# Patient Record
Sex: FEMALE | Race: WHITE | NOT HISPANIC OR LATINO | Employment: UNEMPLOYED | URBAN - METROPOLITAN AREA
[De-identification: names, ages, dates, MRNs, and addresses within clinical notes are randomized per-mention and may not be internally consistent; named-entity substitution may affect disease eponyms.]

---

## 2017-01-11 RX ORDER — CLOPIDOGREL BISULFATE 75 MG/1
TABLET ORAL
Qty: 90 TABLET | Refills: 3 | Status: SHIPPED | OUTPATIENT
Start: 2017-01-11

## 2017-03-29 ENCOUNTER — LAB (OUTPATIENT)
Dept: LAB | Facility: HOSPITAL | Age: 42
End: 2017-03-29

## 2017-03-29 ENCOUNTER — TRANSCRIBE ORDERS (OUTPATIENT)
Dept: LAB | Facility: HOSPITAL | Age: 42
End: 2017-03-29

## 2017-03-29 DIAGNOSIS — A04.72 INTESTINAL INFECTION DUE TO CLOSTRIDIUM DIFFICILE: ICD-10-CM

## 2017-03-29 DIAGNOSIS — L02.416 ABSCESS OF LEFT HIP: Primary | ICD-10-CM

## 2017-03-29 DIAGNOSIS — L03.116 CELLULITIS OF LEFT FOOT: ICD-10-CM

## 2017-03-29 DIAGNOSIS — L02.416 ABSCESS OF LEFT HIP: ICD-10-CM

## 2017-03-29 LAB
ANION GAP SERPL CALCULATED.3IONS-SCNC: -4 MMOL/L (ref 3–11)
BUN BLD-MCNC: 10 MG/DL (ref 9–23)
BUN/CREAT SERPL: 12.5 (ref 7–25)
CALCIUM SPEC-SCNC: 9.5 MG/DL (ref 8.7–10.4)
CHLORIDE SERPL-SCNC: 107 MMOL/L (ref 99–109)
CO2 SERPL-SCNC: 35 MMOL/L (ref 20–31)
CREAT BLD-MCNC: 0.8 MG/DL (ref 0.6–1.3)
GFR SERPL CREATININE-BSD FRML MDRD: 78 ML/MIN/1.73
GLUCOSE BLD-MCNC: 154 MG/DL (ref 70–100)
POTASSIUM BLD-SCNC: 4.3 MMOL/L (ref 3.5–5.5)
SODIUM BLD-SCNC: 138 MMOL/L (ref 132–146)

## 2017-03-29 PROCEDURE — 36415 COLL VENOUS BLD VENIPUNCTURE: CPT | Performed by: INTERNAL MEDICINE

## 2017-03-29 PROCEDURE — 87205 SMEAR GRAM STAIN: CPT | Performed by: INTERNAL MEDICINE

## 2017-03-29 PROCEDURE — 80048 BASIC METABOLIC PNL TOTAL CA: CPT | Performed by: INTERNAL MEDICINE

## 2017-03-29 PROCEDURE — 87070 CULTURE OTHR SPECIMN AEROBIC: CPT | Performed by: INTERNAL MEDICINE

## 2017-03-31 LAB
BACTERIA SPEC AEROBE CULT: NO GROWTH
GRAM STN SPEC: NORMAL

## 2017-05-22 DIAGNOSIS — Z01.818 PRE-OP EVALUATION: Primary | ICD-10-CM

## 2017-05-27 ENCOUNTER — RESULTS ENCOUNTER (OUTPATIENT)
Dept: PAIN MEDICINE | Facility: CLINIC | Age: 42
End: 2017-05-27

## 2017-05-27 DIAGNOSIS — Z01.818 PRE-OP EVALUATION: ICD-10-CM

## 2019-09-10 ENCOUNTER — TRANSCRIBE ORDERS (OUTPATIENT)
Dept: ADMINISTRATIVE | Facility: HOSPITAL | Age: 44
End: 2019-09-10

## 2019-09-10 DIAGNOSIS — K43.9 VENTRAL HERNIA WITHOUT OBSTRUCTION OR GANGRENE: Primary | ICD-10-CM

## 2019-09-17 ENCOUNTER — APPOINTMENT (OUTPATIENT)
Dept: CT IMAGING | Facility: HOSPITAL | Age: 44
End: 2019-09-17

## 2019-09-20 ENCOUNTER — HOSPITAL ENCOUNTER (OUTPATIENT)
Dept: CT IMAGING | Facility: HOSPITAL | Age: 44
Discharge: HOME | End: 2019-09-20
Admitting: SURGERY

## 2019-09-20 DIAGNOSIS — K43.9 VENTRAL HERNIA WITHOUT OBSTRUCTION OR GANGRENE: ICD-10-CM

## 2019-09-20 PROCEDURE — 74176 CT ABD & PELVIS W/O CONTRAST: CPT

## 2022-01-01 ENCOUNTER — APPOINTMENT (OUTPATIENT)
Dept: GENERAL RADIOLOGY | Facility: HOSPITAL | Age: 47
End: 2022-01-01

## 2022-01-01 ENCOUNTER — HOSPITAL ENCOUNTER (INPATIENT)
Facility: HOSPITAL | Age: 47
LOS: 1 days | End: 2022-06-13
Attending: EMERGENCY MEDICINE | Admitting: INTERNAL MEDICINE

## 2022-01-01 VITALS
HEART RATE: 44 BPM | WEIGHT: 190.1 LBS | TEMPERATURE: 97.9 F | HEIGHT: 66 IN | RESPIRATION RATE: 16 BRPM | DIASTOLIC BLOOD PRESSURE: 73 MMHG | SYSTOLIC BLOOD PRESSURE: 127 MMHG | OXYGEN SATURATION: 48 % | BODY MASS INDEX: 30.55 KG/M2

## 2022-01-01 DIAGNOSIS — I73.9 PERIPHERAL VASCULAR DISEASE: ICD-10-CM

## 2022-01-01 DIAGNOSIS — S91.152A OPEN WOUND OF LEFT GREAT TOE DUE TO DOG BITE: ICD-10-CM
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
## 2022-01-01 DIAGNOSIS — A41.9 SEPSIS WITHOUT ACUTE ORGAN DYSFUNCTION, DUE TO UNSPECIFIED ORGANISM: Primary | ICD-10-CM

## 2022-01-01 DIAGNOSIS — E87.6 HYPOKALEMIA: ICD-10-CM

## 2022-01-01 DIAGNOSIS — I96 GANGRENE OF LEFT FOOT: ICD-10-CM

## 2022-01-01 DIAGNOSIS — E11.9 NON-INSULIN DEPENDENT TYPE 2 DIABETES MELLITUS: ICD-10-CM

## 2022-01-01 LAB
ALBUMIN SERPL-MCNC: 2.7 G/DL (ref 3.5–5.2)
ALBUMIN/GLOB SERPL: 0.7 G/DL
ALP SERPL-CCNC: 191 U/L (ref 39–117)
ALT SERPL W P-5'-P-CCNC: 8 U/L (ref 1–33)
ANION GAP SERPL CALCULATED.3IONS-SCNC: 11 MMOL/L (ref 5–15)
AST SERPL-CCNC: 23 U/L (ref 1–32)
B PARAPERT DNA SPEC QL NAA+PROBE: NOT DETECTED
B PERT DNA SPEC QL NAA+PROBE: NOT DETECTED
BASOPHILS # BLD AUTO: 0.03 10*3/MM3 (ref 0–0.2)
BASOPHILS NFR BLD AUTO: 0.2 % (ref 0–1.5)
BILIRUB SERPL-MCNC: 3 MG/DL (ref 0–1.2)
BUN SERPL-MCNC: 12 MG/DL (ref 6–20)
BUN/CREAT SERPL: 22.6 (ref 7–25)
C PNEUM DNA NPH QL NAA+NON-PROBE: NOT DETECTED
CALCIUM SPEC-SCNC: 8.7 MG/DL (ref 8.6–10.5)
CHLORIDE SERPL-SCNC: 97 MMOL/L (ref 98–107)
CO2 SERPL-SCNC: 31 MMOL/L (ref 22–29)
CREAT SERPL-MCNC: 0.53 MG/DL (ref 0.57–1)
CRP SERPL-MCNC: 11.65 MG/DL (ref 0–0.5)
D-LACTATE SERPL-SCNC: 2 MMOL/L (ref 0.5–2)
DEPRECATED RDW RBC AUTO: 65.2 FL (ref 37–54)
EGFRCR SERPLBLD CKD-EPI 2021: 114.2 ML/MIN/1.73
EOSINOPHIL # BLD AUTO: 0.04 10*3/MM3 (ref 0–0.4)
EOSINOPHIL NFR BLD AUTO: 0.3 % (ref 0.3–6.2)
ERYTHROCYTE [DISTWIDTH] IN BLOOD BY AUTOMATED COUNT: 18.8 % (ref 12.3–15.4)
ERYTHROCYTE [SEDIMENTATION RATE] IN BLOOD: 45 MM/HR (ref 0–20)
FERRITIN SERPL-MCNC: 207.4 NG/ML (ref 13–150)
FLUAV RNA RESP QL NAA+PROBE: NOT DETECTED
FLUAV SUBTYP SPEC NAA+PROBE: NOT DETECTED
FLUBV RNA ISLT QL NAA+PROBE: NOT DETECTED
FLUBV RNA RESP QL NAA+PROBE: NOT DETECTED
GLOBULIN UR ELPH-MCNC: 4.1 GM/DL
GLUCOSE SERPL-MCNC: 97 MG/DL (ref 65–99)
HADV DNA SPEC NAA+PROBE: NOT DETECTED
HCOV 229E RNA SPEC QL NAA+PROBE: NOT DETECTED
HCOV HKU1 RNA SPEC QL NAA+PROBE: NOT DETECTED
HCOV NL63 RNA SPEC QL NAA+PROBE: NOT DETECTED
HCOV OC43 RNA SPEC QL NAA+PROBE: NOT DETECTED
HCT VFR BLD AUTO: 36.4 % (ref 34–46.6)
HGB BLD-MCNC: 11.6 G/DL (ref 12–15.9)
HMPV RNA NPH QL NAA+NON-PROBE: NOT DETECTED
HOLD SPECIMEN: NORMAL
HPIV1 RNA ISLT QL NAA+PROBE: NOT DETECTED
HPIV2 RNA SPEC QL NAA+PROBE: NOT DETECTED
HPIV3 RNA NPH QL NAA+PROBE: NOT DETECTED
HPIV4 P GENE NPH QL NAA+PROBE: NOT DETECTED
IMM GRANULOCYTES # BLD AUTO: 0.12 10*3/MM3 (ref 0–0.05)
IMM GRANULOCYTES NFR BLD AUTO: 0.9 % (ref 0–0.5)
LDH SERPL-CCNC: 305 U/L (ref 135–214)
LYMPHOCYTES # BLD AUTO: 1.74 10*3/MM3 (ref 0.7–3.1)
LYMPHOCYTES NFR BLD AUTO: 13 % (ref 19.6–45.3)
M PNEUMO IGG SER IA-ACNC: NOT DETECTED
MAGNESIUM SERPL-MCNC: 1.7 MG/DL (ref 1.6–2.6)
MCH RBC QN AUTO: 29.9 PG (ref 26.6–33)
MCHC RBC AUTO-ENTMCNC: 31.9 G/DL (ref 31.5–35.7)
MCV RBC AUTO: 93.8 FL (ref 79–97)
MONOCYTES # BLD AUTO: 0.74 10*3/MM3 (ref 0.1–0.9)
MONOCYTES NFR BLD AUTO: 5.5 % (ref 5–12)
NEUTROPHILS NFR BLD AUTO: 10.75 10*3/MM3 (ref 1.7–7)
NEUTROPHILS NFR BLD AUTO: 80.1 % (ref 42.7–76)
NRBC BLD AUTO-RTO: 0 /100 WBC (ref 0–0.2)
PLATELET # BLD AUTO: 141 10*3/MM3 (ref 140–450)
PMV BLD AUTO: 10.7 FL (ref 6–12)
POTASSIUM SERPL-SCNC: 2.9 MMOL/L (ref 3.5–5.2)
PROCALCITONIN SERPL-MCNC: 1.02 NG/ML (ref 0–0.25)
PROT SERPL-MCNC: 6.8 G/DL (ref 6–8.5)
RBC # BLD AUTO: 3.88 10*6/MM3 (ref 3.77–5.28)
RHINOVIRUS RNA SPEC NAA+PROBE: NOT DETECTED
RSV RNA NPH QL NAA+NON-PROBE: NOT DETECTED
RSV RNA NPH QL NAA+NON-PROBE: NOT DETECTED
SARS-COV-2 RNA NPH QL NAA+NON-PROBE: DETECTED
SARS-COV-2 RNA RESP QL NAA+PROBE: DETECTED
SODIUM SERPL-SCNC: 139 MMOL/L (ref 136–145)
WBC NRBC COR # BLD: 13.42 10*3/MM3 (ref 3.4–10.8)
WHOLE BLOOD HOLD COAG: NORMAL
WHOLE BLOOD HOLD SPECIMEN: NORMAL

## 2022-01-01 PROCEDURE — 25010000002 MAGNESIUM SULFATE PER 500 MG OF MAGNESIUM: Performed by: INTERNAL MEDICINE

## 2022-01-01 PROCEDURE — 99223 1ST HOSP IP/OBS HIGH 75: CPT | Performed by: INTERNAL MEDICINE

## 2022-01-01 PROCEDURE — 83735 ASSAY OF MAGNESIUM: CPT | Performed by: PHYSICIAN ASSISTANT

## 2022-01-01 PROCEDURE — 25010000002 ONDANSETRON PER 1 MG: Performed by: PHYSICIAN ASSISTANT

## 2022-01-01 PROCEDURE — 71045 X-RAY EXAM CHEST 1 VIEW: CPT

## 2022-01-01 PROCEDURE — 84145 PROCALCITONIN (PCT): CPT | Performed by: PHYSICIAN ASSISTANT

## 2022-01-01 PROCEDURE — 87147 CULTURE TYPE IMMUNOLOGIC: CPT | Performed by: PHYSICIAN ASSISTANT

## 2022-01-01 PROCEDURE — 87205 SMEAR GRAM STAIN: CPT | Performed by: PHYSICIAN ASSISTANT

## 2022-01-01 PROCEDURE — 25010000002 VANCOMYCIN 10 G RECONSTITUTED SOLUTION: Performed by: PHYSICIAN ASSISTANT

## 2022-01-01 PROCEDURE — 94799 UNLISTED PULMONARY SVC/PX: CPT

## 2022-01-01 PROCEDURE — 0 MAGNESIUM SULFATE 4 GM/100ML SOLUTION: Performed by: PHYSICIAN ASSISTANT

## 2022-01-01 PROCEDURE — 99238 HOSP IP/OBS DSCHRG MGMT 30/<: CPT | Performed by: INTERNAL MEDICINE

## 2022-01-01 PROCEDURE — 5A12012 PERFORMANCE OF CARDIAC OUTPUT, SINGLE, MANUAL: ICD-10-PCS | Performed by: INTERNAL MEDICINE

## 2022-01-01 PROCEDURE — 85025 COMPLETE CBC W/AUTO DIFF WBC: CPT | Performed by: EMERGENCY MEDICINE

## 2022-01-01 PROCEDURE — 87070 CULTURE OTHR SPECIMN AEROBIC: CPT | Performed by: PHYSICIAN ASSISTANT

## 2022-01-01 PROCEDURE — 25010000002 DOBUTAMINE PER 250 MG: Performed by: INTERNAL MEDICINE

## 2022-01-01 PROCEDURE — 83605 ASSAY OF LACTIC ACID: CPT | Performed by: EMERGENCY MEDICINE

## 2022-01-01 PROCEDURE — 87186 SC STD MICRODIL/AGAR DIL: CPT | Performed by: PHYSICIAN ASSISTANT

## 2022-01-01 PROCEDURE — 87040 BLOOD CULTURE FOR BACTERIA: CPT | Performed by: EMERGENCY MEDICINE

## 2022-01-01 PROCEDURE — 99285 EMERGENCY DEPT VISIT HI MDM: CPT

## 2022-01-01 PROCEDURE — 87077 CULTURE AEROBIC IDENTIFY: CPT | Performed by: PHYSICIAN ASSISTANT

## 2022-01-01 PROCEDURE — 92950 HEART/LUNG RESUSCITATION CPR: CPT

## 2022-01-01 PROCEDURE — 87040 BLOOD CULTURE FOR BACTERIA: CPT | Performed by: PHYSICIAN ASSISTANT

## 2022-01-01 PROCEDURE — 0202U NFCT DS 22 TRGT SARS-COV-2: CPT | Performed by: PHYSICIAN ASSISTANT

## 2022-01-01 PROCEDURE — 25010000002 EPINEPHRINE 1 MG/10ML SOLUTION PREFILLED SYRINGE: Performed by: INTERNAL MEDICINE

## 2022-01-01 PROCEDURE — 25010000002 TENECTEPLASE PER 50 MG: Performed by: NURSE PRACTITIONER

## 2022-01-01 PROCEDURE — 86140 C-REACTIVE PROTEIN: CPT | Performed by: PHYSICIAN ASSISTANT

## 2022-01-01 PROCEDURE — 25010000002 PIPERACILLIN SOD-TAZOBACTAM PER 1 G: Performed by: PHYSICIAN ASSISTANT

## 2022-01-01 PROCEDURE — 87637 SARSCOV2&INF A&B&RSV AMP PRB: CPT | Performed by: PHYSICIAN ASSISTANT

## 2022-01-01 PROCEDURE — 85652 RBC SED RATE AUTOMATED: CPT | Performed by: PHYSICIAN ASSISTANT

## 2022-01-01 PROCEDURE — 73630 X-RAY EXAM OF FOOT: CPT

## 2022-01-01 PROCEDURE — 80053 COMPREHEN METABOLIC PANEL: CPT | Performed by: EMERGENCY MEDICINE

## 2022-01-01 PROCEDURE — 82728 ASSAY OF FERRITIN: CPT | Performed by: PHYSICIAN ASSISTANT

## 2022-01-01 PROCEDURE — 83615 LACTATE (LD) (LDH) ENZYME: CPT | Performed by: PHYSICIAN ASSISTANT

## 2022-01-01 PROCEDURE — 36415 COLL VENOUS BLD VENIPUNCTURE: CPT

## 2022-01-01 PROCEDURE — 25010000002 HYDROMORPHONE PER 4 MG: Performed by: EMERGENCY MEDICINE

## 2022-01-01 RX ORDER — MAGNESIUM SULFATE HEPTAHYDRATE 40 MG/ML
2 INJECTION, SOLUTION INTRAVENOUS AS NEEDED
Status: DISCONTINUED | OUTPATIENT
Start: 2022-01-01 | End: 2022-01-01 | Stop reason: HOSPADM

## 2022-01-01 RX ORDER — ACETAMINOPHEN 325 MG/1
650 TABLET ORAL EVERY 4 HOURS PRN
Status: DISCONTINUED | OUTPATIENT
Start: 2022-01-01 | End: 2022-01-01 | Stop reason: HOSPADM

## 2022-01-01 RX ORDER — ONDANSETRON 2 MG/ML
4 INJECTION INTRAMUSCULAR; INTRAVENOUS ONCE
Status: COMPLETED | OUTPATIENT
Start: 2022-01-01 | End: 2022-01-01

## 2022-01-01 RX ORDER — EPINEPHRINE 0.1 MG/ML
SYRINGE (ML) INJECTION
Status: COMPLETED | OUTPATIENT
Start: 2022-01-01 | End: 2022-01-01

## 2022-01-01 RX ORDER — PREGABALIN 225 MG/1
CAPSULE ORAL
COMMUNITY

## 2022-01-01 RX ORDER — ATORVASTATIN CALCIUM 20 MG/1
20 TABLET, FILM COATED ORAL DAILY
Status: DISCONTINUED | OUTPATIENT
Start: 2022-01-01 | End: 2022-01-01 | Stop reason: HOSPADM

## 2022-01-01 RX ORDER — BENZONATATE 100 MG/1
100 CAPSULE ORAL 3 TIMES DAILY PRN
Status: DISCONTINUED | OUTPATIENT
Start: 2022-01-01 | End: 2022-01-01 | Stop reason: HOSPADM

## 2022-01-01 RX ORDER — MAGNESIUM SULFATE HEPTAHYDRATE 40 MG/ML
4 INJECTION, SOLUTION INTRAVENOUS AS NEEDED
Status: DISCONTINUED | OUTPATIENT
Start: 2022-01-01 | End: 2022-01-01 | Stop reason: HOSPADM

## 2022-01-01 RX ORDER — ASPIRIN 325 MG
325 TABLET ORAL DAILY
Status: DISCONTINUED | OUTPATIENT
Start: 2022-01-01 | End: 2022-01-01 | Stop reason: HOSPADM

## 2022-01-01 RX ORDER — AMITRIPTYLINE HYDROCHLORIDE 25 MG/1
25 TABLET, FILM COATED ORAL NIGHTLY
Status: DISCONTINUED | OUTPATIENT
Start: 2022-01-01 | End: 2022-01-01 | Stop reason: HOSPADM

## 2022-01-01 RX ORDER — NICOTINE POLACRILEX 4 MG
15 LOZENGE BUCCAL
Status: DISCONTINUED | OUTPATIENT
Start: 2022-01-01 | End: 2022-01-01 | Stop reason: HOSPADM

## 2022-01-01 RX ORDER — L.ACID,PARA/B.BIFIDUM/S.THERM 8B CELL
1 CAPSULE ORAL DAILY
Status: DISCONTINUED | OUTPATIENT
Start: 2022-01-01 | End: 2022-01-01 | Stop reason: HOSPADM

## 2022-01-01 RX ORDER — OXYCODONE HYDROCHLORIDE AND ACETAMINOPHEN 5; 325 MG/1; MG/1
1 TABLET ORAL EVERY 6 HOURS PRN
COMMUNITY

## 2022-01-01 RX ORDER — SODIUM CHLORIDE 0.9 % (FLUSH) 0.9 %
10 SYRINGE (ML) INJECTION EVERY 12 HOURS SCHEDULED
Status: DISCONTINUED | OUTPATIENT
Start: 2022-01-01 | End: 2022-01-01 | Stop reason: HOSPADM

## 2022-01-01 RX ORDER — DEXTROSE MONOHYDRATE 25 G/50ML
25 INJECTION, SOLUTION INTRAVENOUS
Status: DISCONTINUED | OUTPATIENT
Start: 2022-01-01 | End: 2022-01-01 | Stop reason: HOSPADM

## 2022-01-01 RX ORDER — POTASSIUM CHLORIDE 7.45 MG/ML
10 INJECTION INTRAVENOUS
Status: DISCONTINUED | OUTPATIENT
Start: 2022-01-01 | End: 2022-01-01 | Stop reason: HOSPADM

## 2022-01-01 RX ORDER — SODIUM CHLORIDE 0.9 % (FLUSH) 0.9 %
10 SYRINGE (ML) INJECTION AS NEEDED
Status: DISCONTINUED | OUTPATIENT
Start: 2022-01-01 | End: 2022-01-01 | Stop reason: HOSPADM

## 2022-01-01 RX ORDER — METOPROLOL TARTRATE 50 MG/1
50 TABLET, FILM COATED ORAL EVERY 12 HOURS
Status: DISCONTINUED | OUTPATIENT
Start: 2022-01-01 | End: 2022-01-01 | Stop reason: HOSPADM

## 2022-01-01 RX ORDER — INSULIN LISPRO 100 [IU]/ML
0-7 INJECTION, SOLUTION INTRAVENOUS; SUBCUTANEOUS
Status: DISCONTINUED | OUTPATIENT
Start: 2022-01-01 | End: 2022-01-01 | Stop reason: HOSPADM

## 2022-01-01 RX ORDER — OXYCODONE HYDROCHLORIDE AND ACETAMINOPHEN 5; 325 MG/1; MG/1
1 TABLET ORAL EVERY 6 HOURS PRN
Status: DISCONTINUED | OUTPATIENT
Start: 2022-01-01 | End: 2022-01-01 | Stop reason: HOSPADM

## 2022-01-01 RX ORDER — CHOLECALCIFEROL (VITAMIN D3) 125 MCG
5 CAPSULE ORAL NIGHTLY PRN
Status: DISCONTINUED | OUTPATIENT
Start: 2022-01-01 | End: 2022-01-01 | Stop reason: HOSPADM

## 2022-01-01 RX ORDER — POTASSIUM CHLORIDE 1.5 G/1.77G
40 POWDER, FOR SOLUTION ORAL AS NEEDED
Status: DISCONTINUED | OUTPATIENT
Start: 2022-01-01 | End: 2022-01-01 | Stop reason: HOSPADM

## 2022-01-01 RX ORDER — HYDROMORPHONE HYDROCHLORIDE 1 MG/ML
0.5 INJECTION, SOLUTION INTRAMUSCULAR; INTRAVENOUS; SUBCUTANEOUS
Status: DISCONTINUED | OUTPATIENT
Start: 2022-01-01 | End: 2022-01-01 | Stop reason: HOSPADM

## 2022-01-01 RX ORDER — DEXAMETHASONE SODIUM PHOSPHATE 4 MG/ML
6 INJECTION, SOLUTION INTRA-ARTICULAR; INTRALESIONAL; INTRAMUSCULAR; INTRAVENOUS; SOFT TISSUE DAILY
Status: DISCONTINUED | OUTPATIENT
Start: 2022-01-01 | End: 2022-01-01 | Stop reason: HOSPADM

## 2022-01-01 RX ORDER — POTASSIUM CHLORIDE 750 MG/1
20 CAPSULE, EXTENDED RELEASE ORAL ONCE
Status: COMPLETED | OUTPATIENT
Start: 2022-01-01 | End: 2022-01-01

## 2022-01-01 RX ORDER — DEXTROSE MONOHYDRATE 25 G/50ML
INJECTION, SOLUTION INTRAVENOUS
Status: COMPLETED | OUTPATIENT
Start: 2022-01-01 | End: 2022-01-01

## 2022-01-01 RX ORDER — POTASSIUM CHLORIDE 750 MG/1
40 CAPSULE, EXTENDED RELEASE ORAL AS NEEDED
Status: DISCONTINUED | OUTPATIENT
Start: 2022-01-01 | End: 2022-01-01 | Stop reason: HOSPADM

## 2022-01-01 RX ORDER — MAGNESIUM SULFATE HEPTAHYDRATE 500 MG/ML
INJECTION, SOLUTION INTRAMUSCULAR; INTRAVENOUS
Status: COMPLETED | OUTPATIENT
Start: 2022-01-01 | End: 2022-01-01

## 2022-01-01 RX ORDER — ONDANSETRON 2 MG/ML
4 INJECTION INTRAMUSCULAR; INTRAVENOUS EVERY 6 HOURS PRN
Status: DISCONTINUED | OUTPATIENT
Start: 2022-01-01 | End: 2022-01-01 | Stop reason: HOSPADM

## 2022-01-01 RX ORDER — ATORVASTATIN CALCIUM 20 MG/1
TABLET, FILM COATED ORAL
Qty: 90 TABLET | Refills: 1 | Status: SHIPPED | OUTPATIENT
Start: 2022-01-01

## 2022-01-01 RX ORDER — DOBUTAMINE HYDROCHLORIDE 100 MG/100ML
INJECTION INTRAVENOUS
Status: COMPLETED | OUTPATIENT
Start: 2022-01-01 | End: 2022-01-01

## 2022-01-01 RX ORDER — CALCIUM CHLORIDE 100 MG/ML
INJECTION INTRAVENOUS; INTRAVENTRICULAR
Status: COMPLETED | OUTPATIENT
Start: 2022-01-01 | End: 2022-01-01

## 2022-01-01 RX ADMIN — EPINEPHRINE 1 MG: 0.1 INJECTION INTRACARDIAC; INTRAVENOUS at 03:24

## 2022-01-01 RX ADMIN — EPINEPHRINE 1 MG: 0.1 INJECTION INTRACARDIAC; INTRAVENOUS at 03:48

## 2022-01-01 RX ADMIN — EPINEPHRINE 1 MG: 0.1 INJECTION INTRACARDIAC; INTRAVENOUS at 03:44

## 2022-01-01 RX ADMIN — DEXTROSE MONOHYDRATE 25 G: 25 INJECTION, SOLUTION INTRAVENOUS at 03:30

## 2022-01-01 RX ADMIN — EPINEPHRINE 1 MG: 0.1 INJECTION INTRACARDIAC; INTRAVENOUS at 03:51

## 2022-01-01 RX ADMIN — CALCIUM CHLORIDE 1 G: 100 INJECTION INTRAVENOUS; INTRAVENTRICULAR at 03:25

## 2022-01-01 RX ADMIN — AMITRIPTYLINE HYDROCHLORIDE 25 MG: 25 TABLET, FILM COATED ORAL at 23:32

## 2022-01-01 RX ADMIN — EPINEPHRINE 1 MG: 0.1 INJECTION INTRACARDIAC; INTRAVENOUS at 03:57

## 2022-01-01 RX ADMIN — TENECTEPLASE 50 MG: KIT at 03:38

## 2022-01-01 RX ADMIN — SODIUM BICARBONATE 50 MEQ: 84 INJECTION, SOLUTION INTRAVENOUS at 03:30

## 2022-01-01 RX ADMIN — SODIUM BICARBONATE 50 MEQ: 84 INJECTION, SOLUTION INTRAVENOUS at 03:55

## 2022-01-01 RX ADMIN — HYDROMORPHONE HYDROCHLORIDE 0.5 MG: 1 INJECTION, SOLUTION INTRAMUSCULAR; INTRAVENOUS; SUBCUTANEOUS at 21:39

## 2022-01-01 RX ADMIN — MAGNESIUM SULFATE HEPTAHYDRATE 1 G: 500 INJECTION, SOLUTION INTRAMUSCULAR; INTRAVENOUS at 03:33

## 2022-01-01 RX ADMIN — EPINEPHRINE 1 MG: 0.1 INJECTION INTRACARDIAC; INTRAVENOUS at 03:40

## 2022-01-01 RX ADMIN — EPINEPHRINE 1 MG: 0.1 INJECTION INTRACARDIAC; INTRAVENOUS at 03:37

## 2022-01-01 RX ADMIN — CALCIUM CHLORIDE 1 G: 100 INJECTION INTRAVENOUS; INTRAVENTRICULAR at 03:47

## 2022-01-01 RX ADMIN — SODIUM CHLORIDE 1000 ML: 9 INJECTION, SOLUTION INTRAVENOUS at 21:39

## 2022-01-01 RX ADMIN — MAGNESIUM SULFATE IN WATER 4 G: 40 INJECTION, SOLUTION INTRAVENOUS at 02:31

## 2022-01-01 RX ADMIN — TAZOBACTAM SODIUM AND PIPERACILLIN SODIUM 4.5 G: 500; 4 INJECTION, SOLUTION INTRAVENOUS at 21:49

## 2022-01-01 RX ADMIN — EPINEPHRINE 1 MG: 0.1 INJECTION INTRACARDIAC; INTRAVENOUS at 03:54

## 2022-01-01 RX ADMIN — VANCOMYCIN HYDROCHLORIDE 1750 MG: 10 INJECTION, POWDER, LYOPHILIZED, FOR SOLUTION INTRAVENOUS at 22:32

## 2022-01-01 RX ADMIN — EPINEPHRINE 1 MG: 0.1 INJECTION INTRACARDIAC; INTRAVENOUS at 03:27

## 2022-01-01 RX ADMIN — EPINEPHRINE 1 MG: 0.1 INJECTION INTRACARDIAC; INTRAVENOUS at 03:33

## 2022-01-01 RX ADMIN — POTASSIUM CHLORIDE 20 MEQ: 10 CAPSULE, COATED, EXTENDED RELEASE ORAL at 22:32

## 2022-01-01 RX ADMIN — DOBUTAMINE HYDROCHLORIDE 20 MCG/KG/MIN: 100 INJECTION INTRAVENOUS at 03:54

## 2022-01-01 RX ADMIN — ONDANSETRON 4 MG: 2 INJECTION INTRAMUSCULAR; INTRAVENOUS at 21:39

## 2022-01-01 RX ADMIN — METOPROLOL TARTRATE 50 MG: 50 TABLET, FILM COATED ORAL at 23:32

## 2022-01-01 RX ADMIN — EPINEPHRINE 1 MG: 0.1 INJECTION INTRACARDIAC; INTRAVENOUS at 03:30

## 2022-06-12 PROBLEM — A41.9 SEPSIS WITHOUT ACUTE ORGAN DYSFUNCTION, DUE TO UNSPECIFIED ORGANISM (HCC): Status: ACTIVE | Noted: 2022-01-01

## 2022-06-12 PROBLEM — S81.802A OPEN WOUND OF BOTH LOWER EXTREMITIES: Status: ACTIVE | Noted: 2022-01-01

## 2022-06-12 PROBLEM — E11.9 DIABETES MELLITUS, TYPE 2 (HCC): Status: ACTIVE | Noted: 2022-01-01

## 2022-06-12 PROBLEM — Z72.0 TOBACCO ABUSE: Status: ACTIVE | Noted: 2022-01-01

## 2022-06-12 PROBLEM — E87.6 HYPOKALEMIA: Status: ACTIVE | Noted: 2022-01-01

## 2022-06-12 PROBLEM — S81.801A OPEN WOUND OF BOTH LOWER EXTREMITIES: Status: ACTIVE | Noted: 2022-01-01

## 2022-06-12 NOTE — ED PROVIDER NOTES
EMERGENCY DEPARTMENT ENCOUNTER    Pt Name: Venus Conn  MRN: [id number]  Pt :   1975  Room Number:    Date of encounter:  2022  PCP: Thalia Jose APRN  ED Provider: Adalid Robins PA-C    Historian: Patient's .      HPI:  Chief Complaint: Multiple ulcers and dog bites, cellulitis to bilateral feet, patient is diabetic with peripheral vascular disease        Context: Venus Conn is a 46 y.o. female who presents to the ED c/o Ultibro infected ulcers and wounds to bilateral feet.  Patient is a non-insulin-dependent diabetic with a history of peripheral vascular disease.  Patient states they have had puppies at home that have been chewing on the patient's left foot.  According to her , patient fell asleep, due to her diabetic neuropathy she was unable to feel the puppy chewing on her feet.  This was 3 weeks ago.  She also has ulcerative wounds on bilateral feet.  She has swelling and what appears to be gangrenous tissue at the base of her great toe on her left foot.      PAST MEDICAL HISTORY  Past Medical History:   Diagnosis Date   • Acute on chronic systolic congestive heart failure (HCC)    • Chronic pain    • COPD (chronic obstructive pulmonary disease) (HCC)    • Coronary artery disease    • Hyperlipidemia    • Hypertension    • Obesity    • Peripheral vascular disease (HCC)          PAST SURGICAL HISTORY  Past Surgical History:   Procedure Laterality Date   • CHOLECYSTECTOMY     • CORONARY ARTERY BYPASS GRAFT     • OTHER SURGICAL HISTORY      POSTOPERATIVE ARTERIAL EXCISION OF INFECTED GRAFT   • WOUND DEBRIDEMENT      SHARP         FAMILY HISTORY  Family History   Problem Relation Age of Onset   • Diabetes Mother    • Hypertension Mother    • Heart attack Mother    • Other Father         malignant neoplasm         SOCIAL HISTORY  Social History     Socioeconomic History   • Marital status:    Tobacco Use   • Smoking status: Current Every Day Smoker      Packs/day: 0.50     Types: Cigarettes     Last attempt to quit: 2015     Years since quittin.9   • Smokeless tobacco: Never Used   Substance and Sexual Activity   • Alcohol use: No   • Drug use: Never   • Sexual activity: Defer         ALLERGIES  Patient has no known allergies.        REVIEW OF SYSTEMS  Review of Systems   Constitutional: Positive for activity change, chills, fatigue and fever.   HENT: Negative for congestion, rhinorrhea and sore throat.    Respiratory: Negative for cough, shortness of breath and wheezing.    Cardiovascular: Negative for chest pain and palpitations.   Gastrointestinal: Negative for abdominal pain, nausea and vomiting.   Musculoskeletal: Positive for arthralgias. Negative for back pain, myalgias and neck pain.   Skin: Positive for wound (Multiple infected wounds to bilateral feet and ankles).          All systems reviewed and negative except for those discussed in HPI.       PHYSICAL EXAM    I have reviewed the triage vital signs and nursing notes.    ED Triage Vitals [22]   Temp Heart Rate Resp BP SpO2   98.1 °F (36.7 °C) 106 18 131/69 96 %      Temp src Heart Rate Source Patient Position BP Location FiO2 (%)   Oral Monitor Sitting Right arm --       Physical Exam  GENERAL:   Tearful, appears to be in significant mount of discomfort, history provided by patient's   HENT: Nares patent.  EYES: No scleral icterus.  CV: Regular rhythm, regular rate.  RESPIRATORY: Normal effort.  No audible wheezes, rales or rhonchi.  ABDOMEN: Soft, nontender  MUSCULOSKELETAL: Multiple shallow ulcerative lesions to bilateral feet.  Right ankle with a 5 cm shallow beefy red ulcerative lesion to the lateral aspect of the ankle wounds on the right foot have embedded dog hair and what appears to be fecal material.  The medial aspect of the right great toe at the level of the end TP joint has a 3 x 4 cm beefy red shallow ulcerative wound with surrounding erythema that also appears to  have hair embedded in the wound.  The left foot has a large swelling gangrenous area just at the base of the great toe, plantar surface that is exquisitely tender to palpation.  The second toe has a shallow ulcerative wound that comprises about the top 50% of the toe that the patient's  tells me is where the dog was biting her toe.  NEURO: Alert, moves all extremities, follows commands.  SKIN: Warm, dry, no rash visualized.        LAB RESULTS  Recent Results (from the past 24 hour(s))   Comprehensive Metabolic Panel    Collection Time: 06/12/22  7:56 PM    Specimen: Blood   Result Value Ref Range    Glucose 97 65 - 99 mg/dL    BUN 12 6 - 20 mg/dL    Creatinine 0.53 (L) 0.57 - 1.00 mg/dL    Sodium 139 136 - 145 mmol/L    Potassium 2.9 (L) 3.5 - 5.2 mmol/L    Chloride 97 (L) 98 - 107 mmol/L    CO2 31.0 (H) 22.0 - 29.0 mmol/L    Calcium 8.7 8.6 - 10.5 mg/dL    Total Protein 6.8 6.0 - 8.5 g/dL    Albumin 2.70 (L) 3.50 - 5.20 g/dL    ALT (SGPT) 8 1 - 33 U/L    AST (SGOT) 23 1 - 32 U/L    Alkaline Phosphatase 191 (H) 39 - 117 U/L    Total Bilirubin 3.0 (H) 0.0 - 1.2 mg/dL    Globulin 4.1 gm/dL    A/G Ratio 0.7 g/dL    BUN/Creatinine Ratio 22.6 7.0 - 25.0    Anion Gap 11.0 5.0 - 15.0 mmol/L    eGFR 114.2 >60.0 mL/min/1.73   Lactic Acid, Plasma    Collection Time: 06/12/22  7:56 PM    Specimen: Blood   Result Value Ref Range    Lactate 2.0 0.5 - 2.0 mmol/L   CBC Auto Differential    Collection Time: 06/12/22  7:56 PM    Specimen: Blood   Result Value Ref Range    WBC 13.42 (H) 3.40 - 10.80 10*3/mm3    RBC 3.88 3.77 - 5.28 10*6/mm3    Hemoglobin 11.6 (L) 12.0 - 15.9 g/dL    Hematocrit 36.4 34.0 - 46.6 %    MCV 93.8 79.0 - 97.0 fL    MCH 29.9 26.6 - 33.0 pg    MCHC 31.9 31.5 - 35.7 g/dL    RDW 18.8 (H) 12.3 - 15.4 %    RDW-SD 65.2 (H) 37.0 - 54.0 fl    MPV 10.7 6.0 - 12.0 fL    Platelets 141 140 - 450 10*3/mm3    Neutrophil % 80.1 (H) 42.7 - 76.0 %    Lymphocyte % 13.0 (L) 19.6 - 45.3 %    Monocyte % 5.5 5.0 - 12.0 %     Eosinophil % 0.3 0.3 - 6.2 %    Basophil % 0.2 0.0 - 1.5 %    Immature Grans % 0.9 (H) 0.0 - 0.5 %    Neutrophils, Absolute 10.75 (H) 1.70 - 7.00 10*3/mm3    Lymphocytes, Absolute 1.74 0.70 - 3.10 10*3/mm3    Monocytes, Absolute 0.74 0.10 - 0.90 10*3/mm3    Eosinophils, Absolute 0.04 0.00 - 0.40 10*3/mm3    Basophils, Absolute 0.03 0.00 - 0.20 10*3/mm3    Immature Grans, Absolute 0.12 (H) 0.00 - 0.05 10*3/mm3    nRBC 0.0 0.0 - 0.2 /100 WBC   Green Top (Gel)    Collection Time: 06/12/22  7:56 PM   Result Value Ref Range    Extra Tube Hold for add-ons.    Lavender Top    Collection Time: 06/12/22  7:56 PM   Result Value Ref Range    Extra Tube hold for add-on    Gold Top - SST    Collection Time: 06/12/22  7:56 PM   Result Value Ref Range    Extra Tube Hold for add-ons.    Light Blue Top    Collection Time: 06/12/22  7:56 PM   Result Value Ref Range    Extra Tube Hold for add-ons.    Procalcitonin    Collection Time: 06/12/22  7:56 PM    Specimen: Blood   Result Value Ref Range    Procalcitonin 1.02 (H) 0.00 - 0.25 ng/mL   C-reactive Protein    Collection Time: 06/12/22  7:56 PM    Specimen: Blood   Result Value Ref Range    C-Reactive Protein 11.65 (H) 0.00 - 0.50 mg/dL   Sedimentation Rate    Collection Time: 06/12/22  7:56 PM    Specimen: Blood   Result Value Ref Range    Sed Rate 45 (H) 0 - 20 mm/hr   Magnesium    Collection Time: 06/12/22  7:56 PM    Specimen: Blood   Result Value Ref Range    Magnesium 1.7 1.6 - 2.6 mg/dL       If labs were ordered, I independently reviewed the results.        RADIOLOGY  No Radiology Exams Resulted Within Past 24 Hours      PROCEDURES    Procedures    No orders to display       MEDICATIONS GIVEN IN ER    Medications   sodium chloride 0.9 % flush 10 mL (has no administration in time range)   vancomycin 1750 mg/500 mL 0.9% NS IVPB ([facility]) (has no administration in time range)   piperacillin-tazobactam (ZOSYN) 4.5 g in iso-osmotic dextrose 100 mL IVPB (premix) (4.5 g Intravenous  New Bag 6/12/22 2149)   sodium chloride 0.9 % bolus 1,000 mL (1,000 mL Intravenous New Bag 6/12/22 2139)   HYDROmorphone (DILAUDID) injection 0.5 mg (0.5 mg Intravenous Given 6/12/22 2139)   potassium chloride (MICRO-K) CR capsule 20 mEq (has no administration in time range)   ondansetron (ZOFRAN) injection 4 mg (4 mg Intravenous Given 6/12/22 2139)         PROGRESS, DATA ANALYSIS, CONSULTS, AND MEDICAL DECISION MAKING    All labs have been independently reviewed by me.  All radiology studies have been reviewed by me and the radiologist dictating the report.   EKG's have been independently viewed and interpreted by me.      Differential diagnoses: Cellulitis, osteomyelitis, sepsis      ED Course as of 06/12/22 2205   Sun Jun 12, 2022 2018 Sed Rate(!): 45 [TG]   2058 C-Reactive Protein(!): 11.65 [TG]   2058 Procalcitonin(!): 1.02 [TG]   2156 Potassium(!): 2.9 [TG]   2157 WBC(!): 13.42 [TG]      ED Course User Index  [TG] Adalid Robins PA-C     Wounds were cleansed, visible foreign bodies (dog hair and suspected fecal material) removed.  Wound to be covered with nonstick dressing once seen by hospitalist.  Vancomycin and Zosyn initiated here in the emergency department.  We will seek admission to hospitalist service.      AS OF 22:05 EDT VITALS:    BP - 131/69  HR - 106  TEMP - 98.1 °F (36.7 °C) (Oral)  O2 SATS - 96%                  DIAGNOSIS  Final diagnoses:   Sepsis without acute organ dysfunction, due to unspecified organism (HCC)   Gangrene of left foot (HCC)   Open wound of left great toe due to dog bite   Non-insulin dependent type 2 diabetes mellitus (HCC)   Peripheral vascular disease (HCC)   Hypokalemia         DISPOSITION  Admit to hospitalist service           Adalid Robins PA-C  06/12/22 2205

## 2022-06-13 NOTE — H&P
[facility] Medicine Services  HISTORY AND PHYSICAL    Patient Name: Venus Conn  : 1975  MRN: [id number]  Primary Care Physician: Thalia Jose, SYLWIA  Date of admission: 2022    Subjective   Subjective     Chief Complaint:  Lower extremity wound    HPI:  Venus Conn is a 46 y.o. female with a past medical history significant for chronic respiratory failure with COPD and ongoing tobacco abuse on 2L NC as needed, hypertension, HLD, chronic systolic heart failure, CAD s/p CABG, PVD chronically anticoagulated on Xarelto, DM2, and chronic pain. She presents today with complaints of bilateral lower extremity wounds which she first noticed approximately one month ago. Wounds have progressively worsened over that time despite  cleaning and wrapping them. Wounds are foul smelling and there is drainage of blood and purulent fluid. Patient adds that she has significant neuropathy related to DM and has almost zero sensation in lower extremities. States 3 weeks ago  dressed her wounds with Medihoney. Later that evening, the patient fell asleep with her feet exposed and the puppies chewed at the dressing and ate through to her left second toe. There is subsequent increased redness and swelling around the toe and left food with what appears to be gangrenous tissue. Patient has refused medical evaluation of wounds until now.  reports her mother passed away a week ago and the patient has been depressed. She is now ready for treatment and understands that may require amputation. Of note, patient has been dyspneic, notes cough and congestion. Has been requiring use of supplemental more frequently.   Currently there are no complaints of falls or trauma. No fevers, chest pain, or syncope. No dysuria or flank pain. No history of COVID-19. She denies sick contacts. Will admit to inpatient for further evaluation and treatment.      Review of Systems   Constitutional:  Negative for chills, fatigue and fever.   HENT: Positive for congestion. Negative for trouble swallowing.    Eyes: Negative for photophobia and visual disturbance.   Respiratory: Positive for cough and shortness of breath. Negative for choking.    Cardiovascular: Negative for chest pain and leg swelling.   Gastrointestinal: Negative for abdominal pain, diarrhea, nausea and vomiting.   Endocrine: Negative for cold intolerance and heat intolerance.   Genitourinary: Negative for dysuria and flank pain.   Musculoskeletal: Positive for arthralgias and gait problem.   Skin: Positive for color change and wound.   Allergic/Immunologic: Positive for immunocompromised state.   Neurological: Negative for dizziness, weakness and headaches.   Hematological: Negative for adenopathy.   Psychiatric/Behavioral: Negative for agitation and confusion.        All other systems reviewed and are negative.     Personal History     Past Medical History:   Diagnosis Date   • Acute on chronic systolic congestive heart failure (HCC)    • Chronic pain    • COPD (chronic obstructive pulmonary disease) (HCC)    • Coronary artery disease    • Hyperlipidemia    • Hypertension    • Obesity    • Peripheral vascular disease (HCC)              Past Surgical History:   Procedure Laterality Date   • CHOLECYSTECTOMY     • CORONARY ARTERY BYPASS GRAFT     • OTHER SURGICAL HISTORY      POSTOPERATIVE ARTERIAL EXCISION OF INFECTED GRAFT   • WOUND DEBRIDEMENT      SHARP       Family History:  family history includes Diabetes in her mother; Heart attack in her mother; Hypertension in her mother; Other in her father. Otherwise pertinent FHx was reviewed and unremarkable.     Social History:  reports that she has been smoking cigarettes. She has been smoking about 0.50 packs per day. She has never used smokeless tobacco. She reports that she does not drink alcohol and does not use drugs.  Social History     Social History Narrative   • Not on file        Medications:  HYDROcodone-acetaminophen, amitriptyline, aspirin, atorvastatin, cephalexin, clopidogrel, cyclobenzaprine, furosemide, gabapentin, ipratropium-albuterol, lidocaine, metFORMIN, metoprolol tartrate, oxyCODONE-acetaminophen, pregabalin, and rivaroxaban    No Known Allergies    Objective   Objective     Vital Signs:   Temp:  [97.9 °F (36.6 °C)-98.1 °F (36.7 °C)] 97.9 °F (36.6 °C)  Heart Rate:  [102-107] 107  Resp:  [16-18] 16  BP: (116-131)/(69-76) 127/73  Flow (L/min):  [3] 3    Physical Exam   Constitutional: Awake, alert  Eyes: PERRLA, sclerae anicteric, no conjunctival injection  HENT: NCAT, mucous membranes moist  Neck: Supple, no thyromegaly, no lymphadenopathy, trachea midline  Respiratory: wheezes bilaterally, nonlabored respirations   Cardiovascular: RRR, no murmurs, rubs, or gallops, palpable pedal pulses bilaterally  Gastrointestinal: Positive bowel sounds, soft, nontender, nondistended  Musculoskeletal: trace BLE edema, no clubbing or cyanosis to extremities  Psychiatric: flat  affect, cooperative  Neurologic: Oriented x 3, strength symmetric in all extremities, Cranial Nerves grossly intact to confrontation, speech clear  Skin: bilateral lower extremity ulcerated wounds, legs and feet. Left second toe appears gangrenous. Non tender. Sensation decreased. Pulses faint. Left great toe appears gangrenous.      Results Reviewed:  I have personally reviewed most recent indicated data and agree with findings including:  [x]  Laboratory  []  Radiology  []  EKG/Telemetry  []  Pathology  []  Cardiac/Vascular Studies  [x]  Old records  []  Other:  Most pertinent findings include: vitals stable. Potassium 2.9. alk phos 191. WBC 13.42. COVID PCR positive.      LAB RESULTS:      Lab 06/12/22 1956   WBC 13.42*   HEMOGLOBIN 11.6*   HEMATOCRIT 36.4   PLATELETS 141   NEUTROS ABS 10.75*   IMMATURE GRANS (ABS) 0.12*   LYMPHS ABS 1.74   MONOS ABS 0.74   EOS ABS 0.04   MCV 93.8   SED RATE 45*   CRP 11.65*    PROCALCITONIN 1.02*   LACTATE 2.0         Lab 06/12/22 1956   SODIUM 139   POTASSIUM 2.9*   CHLORIDE 97*   CO2 31.0*   ANION GAP 11.0   BUN 12   CREATININE 0.53*   EGFR 114.2   GLUCOSE 97   CALCIUM 8.7   MAGNESIUM 1.7         Lab 06/12/22 1956   TOTAL PROTEIN 6.8   ALBUMIN 2.70*   GLOBULIN 4.1   ALT (SGPT) 8   AST (SGOT) 23   BILIRUBIN 3.0*   ALK PHOS 191*                     Brief Urine Lab Results     None        Microbiology Results (last 10 days)     Procedure Component Value - Date/Time    Wound Culture - Wound, Foot, Left [[id number]] Collected: 06/12/22 2142    Lab Status: Preliminary result Specimen: Wound from Foot, Left Updated: 06/12/22 2221     Gram Stain No WBCs seen      Moderate (3+) Gram positive cocci      Many (4+) Gram negative bacilli      Few (2+) Gram variable bacilli    Wound Culture - Wound, Foot, Right [[id number]] Collected: 06/12/22 2142    Lab Status: Preliminary result Specimen: Wound from Foot, Right Updated: 06/12/22 2218     Gram Stain Rare (1+) WBCs seen      Few (2+) Gram positive cocci          XR Foot 3+ View Left    Result Date: 6/12/2022  3 views left foot CLINICAL INDICATION: Left foot pain. COMPARISON: None. FINDINGS: Abundant soft tissue swelling is present in the forefoot and about the first through third digits. Scattered subcutaneous gas is seen along the second proximal phalanx and the interspace between the first and second proximal phalanges. A possible acute fracture at seen at the base of the second proximal phalanx. There is osteolysis of the distal aspect of the second proximal phalanx.     Impression: 1. Findings suggestive of soft tissue infection in the forefoot involving the first through third toes. Soft tissue gas may reflect ulceration or subcutaneous abscess. 2. Chronic versus acute on chronic second proximal phalangeal osteomyelitis suspected. Electronically signed by:  Yonny Ybarra M.D.  6/12/2022 9:03 PM Mountain Time          Assessment & Plan    Assessment & Plan       Sepsis without acute organ dysfunction, due to unspecified organism (HCC)    Open wounds of both lower extremities    Chronic obstructive pulmonary disease (HCC)    Coronary artery disease    Hypertension    Peripheral vascular disease (HCC)    Diabetes mellitus, type 2 (HCC)    Hypokalemia    Chronic pain    Hyperlipidemia    Obesity    Tobacco abuse      1. Sepsis Secondary to diabetic ulcers:  - obtained blood cultures, ESR, CRP  - will obtain MRI imaging of lower extremities bilaterally  - start zosyn and vancomycin  - consult to wound care  - Vascular surgery, Dr. Portillo  - Infectious disease  - PT/OT  - pain and nausea control  - am labs    2. Acute hypoxic Respiratory Failure  COVID-19 pneumonia  - hypoxic on RA to 75%  - patient insists initial COVID PCR is false positive and refuses to move to COVID unit unless confirmatory tests can corroborate positive result  - repeat COVID-19 PCR, positive here  - will obtain CXR  - CTA chest if ddimer elevated  - routine COVID admission labs  - daily cbc, cmp, crp  - dexamethasone 6 mg IV daily x 10 days  - remdesivir per protocol, pharmacy to dose  - albuterol MDI scheduled Q 4 hours  - delsym Q 12 hours for cough  - tessalon prn cough  - encouraged patient to prone as tolerated  - clear liquid diet  -monitor oxygen requirement and inflammatory markers    3. CAD      HTN      HLD  - history of CABG  - continue ASA, statin, lopressor    4. Chronic Systolic Heart Failure:  - takes lasix 40 mg at home as needed  - appears stable, but awaiting chest imaging and BNP  - daily weights and I&O    5. PVD:  - reported AAA repair ( date deficient)  - on Xarelto. Continue for now    6 . Hypokalemia:  - replace as needed per protocol  - check magnesium  - no EKG changes    7. DM2:  - non insulin dependent  - holding oral hypoglycemics  - check Hba1c was  5.3 back in 2016. Recheck  - SSI with scheduled accu checks    8. Tobacco abuse:  - nicotine patch as  "needed smokes 1/2 PPD.  - plan to discuss cessation      DVT prophylaxis:  xarelto    CODE STATUS:  Full code  Level Of Support Discussed With: Patient  Code Status (Patient has no pulse and is not breathing): CPR (Attempt to Resuscitate)  Medical Interventions (Patient has pulse or is breathing): Full Support      This note has been completed as part of a split-shared workflow.     Electronically signed by Saran Umanzor PA-C, 22, 12:44 AM EDT.      Attending   Admission Attestation       I have seen and examined the patient, performing an independent face-to-face diagnostic evaluation with plan of care reviewed and developed with the advanced practice clinician (APC).      Brief Summary Statement:   Venus Conn is a 46 y.o. female who states that she has had bilateral lower extremity wounds, specifically over the feet and toes, for the last 3-4 weeks.  She states that her  has been \"taking care of them,\" specifically attempting to clean and wrapped them.  3 weeks ago she states that he dressed her foot wounds with Medihoney gel and the patient's dog chewed through to her second toe on the left foot while the patient was asleep; the patient has severe neuropathy with sensation loss of the bilateral lower extremities and was unaware of this until she was awake.  Because the patient's mother was recently , the patient admits she has been depressed and has not sought any medical treatment for any of the aforementioned wounds.  Her  was able to talk her into being seen in the ED today (), which is what prompted her visit here.  She confirms that all of her foot/toe wounds have had purulent and bloody drainage at different times.    Remainder of detailed HPI is as noted by APC and has been reviewed and/or edited by me for completeness.    Attending Physical Exam:  Constitutional: Awake but somnolent.  Answers all questions.  Eyes: PERRLA, sclerae anicteric, no conjunctival " injection  HENT: NCAT, mucous membranes moist.  Neck: Supple, no thyromegaly, no lymphadenopathy, trachea midline  Respiratory: Mild bilateral diffuse wheeze, nonlabored respirations   Cardiovascular: RRR, no murmurs, rubs, or gallops, palpable pedal pulses bilaterally  Gastrointestinal: Positive bowel sounds, soft, nontender, nondistended  Musculoskeletal: Trace bilateral LE edema, no clubbing or cyanosis to extremities  Psychiatric: Flat affect, cooperative but very somnolent, answers questions and follows most commands but falls back to sleep very quickly.  Neurologic: She is oriented to self and to city but could not stay awake to answer all orientation questions, strength symmetric in all extremities, Cranial Nerves grossly intact to confrontation, speech clear  Skin: She has several ulcerated lesions of the distal bilateral lower extremities as well as both of her feet and several on her toes.  The worst appearing is the left first and second toe which both appear gangrenous.  She has almost completely absent sensation from the knees down.    Brief Assessment/Plan :  See detailed assessment and plan developed with APC which I have reviewed and/or edited for completeness.        Admission Status: I believe that this patient meets inpatient criteria due to need for IV antibiotics as well as consultation with vascular surgery regarding possible need for amputation of part of the left foot.  She is also positive for COVID-19 and will need IV steroids and remdesivir.      Demetrius Cuellar,PAVAN, DO  06/13/22

## 2022-06-13 NOTE — SIGNIFICANT NOTE
Exam confirms with auscultation zero audible heart tones and zero audible respirations. Ms.Tina CHUY Conn was pronounced dead at 0400.  MD notified by Patient's RN.    Bianca Gupta RN  Clinical House Supervisor  6/13/2022 04:26 EDT

## 2022-06-13 NOTE — SIGNIFICANT NOTE
Went to check on Pt at 0315, Pt unresponsive, HR dropped to 30s, RRT called, Pt lost pulse and code blue called.

## 2022-06-13 NOTE — CONSULTS
"Pharmacy Consult-Vancomycin Dosing  Venus Conn is a  46 y.o. female receiving vancomycin therapy.     Indication: SSTI  Consulting Provider: Hospitalist  ID Consult:     Goal AUC: 400 - 600 mg/L*hr    Current Antimicrobial Therapy  Anti-Infectives (From admission, onward)      Ordered     Dose/Rate Route Frequency Start Stop    06/12/22 2328  vancomycin 1250 mg/250 mL 0.9% NS IVPB ([facility])        Ordering Provider: Eber Cuellar IV, PharmD    15 mg/kg × 86.2 kg  over 90 Minutes Intravenous Every 12 Hours 06/13/22 1200 06/20/22 1159    06/12/22 2320  piperacillin-tazobactam (ZOSYN) 3.375 g in iso-osmotic dextrose 50 ml (premix)        Ordering Provider: Saran Umanzor PA-C    3.375 g  over 4 Hours Intravenous Every 8 Hours 06/13/22 0600 06/20/22 0559    06/12/22 2319  Pharmacy to dose vancomycin        Ordering Provider: Saran Umanzor PA-C     Does not apply Continuous PRN 06/12/22 2319 06/19/22 2318    06/12/22 2101  piperacillin-tazobactam (ZOSYN) 4.5 g in iso-osmotic dextrose 100 mL IVPB (premix)        Ordering Provider: Adalid Robins PA-C    4.5 g  over 30 Minutes Intravenous Once 06/12/22 2103 06/12/22 2230    06/12/22 2058  vancomycin 1750 mg/500 mL 0.9% NS IVPB ([facility])        Ordering Provider: Adalid Robins PA-C    20 mg/kg × 82.1 kg  over 2 Hours Intravenous Once 06/12/22 2100              Allergies  Allergies as of 06/12/2022    (No Known Allergies)       Labs    Results from last 7 days   Lab Units 06/12/22 1956   BUN mg/dL 12   CREATININE mg/dL 0.53*       Results from last 7 days   Lab Units 06/12/22 1956   WBC 10*3/mm3 13.42*       Evaluation of Dosing     Last Dose Received in the ED/Outside Facility: 1750 mg infusing  Is Patient on Dialysis or Renal Replacement: N    Ht - 167.6 cm (66\")  Wt - 86.2 kg (190 lb 1.6 oz)    Estimated Creatinine Clearance: 143.7 mL/min (A) (by C-G formula based on SCr of 0.53 mg/dL (L)).    Intake & Output (last 3 days)       None      "       Microbiology and Radiology  Microbiology Results (last 10 days)       Procedure Component Value - Date/Time    Wound Culture - Wound, Foot, Left [[id number]] Collected: 06/12/22 2142    Lab Status: Preliminary result Specimen: Wound from Foot, Left Updated: 06/12/22 2221     Gram Stain No WBCs seen      Moderate (3+) Gram positive cocci      Many (4+) Gram negative bacilli      Few (2+) Gram variable bacilli    Wound Culture - Wound, Foot, Right [[id number]] Collected: 06/12/22 2142    Lab Status: Preliminary result Specimen: Wound from Foot, Right Updated: 06/12/22 2218     Gram Stain Rare (1+) WBCs seen      Few (2+) Gram positive cocci            Reported Vancomycin Levels                         [facility] AUC Calculation:    Current AUC:    mg/L*hr    Predicted Steady State AUC on Current Dose:  mg/L*hr  _________________________________    Predicted Steady State AUC on New Dose:   464 mg/L*hr    Assessment/Plan:    Moderate age obese female with low SCr reflective of habitus.  1750 mg IV vancomycin infusing.  Start 1250 mg IV vancomycin q12h at noon.    Random level with AM labs Tuesday.    Thank you for this consult.  Eber Cuellar IV, PharmD, BCPS  6/12/2022  23:28 EDT

## 2022-06-14 NOTE — PAYOR COMM NOTE
"Id # [id number]  Chani Díaz RN, BSN, CMGT-BC  Phone # [phone]  Fax # [phone]  Venus Conn (Dcsd. Female)             Date of Birth   1975    Social Security Number       Address   [address] [facility] [address]    Home Phone   [phone]    MRN   [id number]       Yazidism   None    Marital Status                               Admission Date   22    Admission Type   Emergency    Admitting Provider   Demetrius Cuellar III, DO    Attending Provider       Department, Room/Bed   [facility] 5H, S585/1       Discharge Date   2022    Discharge Disposition       Discharge Destination                               Attending Provider: (none)   Allergies: No Known Allergies    Isolation: None   Infection: COVID (confirmed) (22)   Code Status: Prior   Advance Care Planning Activity    Ht: 167.6 cm (66\")   Wt: 86.2 kg (190 lb 1.6 oz)    Admission Cmt: None   Principal Problem: Sepsis without acute organ dysfunction, due to unspecified organism (HCC) [A41.9]                 Active Insurance as of 2022     Primary Coverage     Payor Plan Insurance Group Employer/Plan Group    [employer]HEM BLUE CROSS ANTHEM BLUE CROSS BLUE SHIELD PPO [id number]     Payor Plan Address Payor Plan Phone Number Payor Plan Fax Number Effective Dates    [address] [phone]  3/1/2019 - None Entered    [facility] [id number]       Subscriber Name Subscriber Birth Date Member ID       LYLA CONN JR 1970 [id number]           Secondary Coverage     Payor Plan Insurance Group Employer/Plan Group    [facility] BY AWAIS [facility] BY AWAIS [id number]     Payor Plan Address Payor Plan Phone Number Payor Plan Fax Number Effective Dates    [address]   10/1/2021 - None Entered    [facility] [id number]       Subscriber Name Subscriber Birth Date Member ID       VENUS CONN 1975 [id number]                      History & Physical      Polo, " Demetrius Segovia III, DO at 22 2321              [facility] Medicine Services  HISTORY AND PHYSICAL    Patient Name: Venus Conn  : 1975  MRN: [id number]  Primary Care Physician: Thalia Jose APRN  Date of admission: 2022    Subjective   Subjective     Chief Complaint:  Lower extremity wound    HPI:  Venus Conn is a 46 y.o. female with a past medical history significant for chronic respiratory failure with COPD and ongoing tobacco abuse on 2L NC as needed, hypertension, HLD, chronic systolic heart failure, CAD s/p CABG, PVD chronically anticoagulated on Xarelto, DM2, and chronic pain. She presents today with complaints of bilateral lower extremity wounds which she first noticed approximately one month ago. Wounds have progressively worsened over that time despite  cleaning and wrapping them. Wounds are foul smelling and there is drainage of blood and purulent fluid. Patient adds that she has significant neuropathy related to DM and has almost zero sensation in lower extremities. States 3 weeks ago  dressed her wounds with Medihoney. Later that evening, the patient fell asleep with her feet exposed and the puppies chewed at the dressing and ate through to her left second toe. There is subsequent increased redness and swelling around the toe and left food with what appears to be gangrenous tissue. Patient has refused medical evaluation of wounds until now.  reports her mother passed away a week ago and the patient has been depressed. She is now ready for treatment and understands that may require amputation. Of note, patient has been dyspneic, notes cough and congestion. Has been requiring use of supplemental more frequently.   Currently there are no complaints of falls or trauma. No fevers, chest pain, or syncope. No dysuria or flank pain. No history of COVID-19. She denies sick contacts. Will admit to inpatient for further evaluation and  treatment.      Review of Systems   Constitutional: Negative for chills, fatigue and fever.   HENT: Positive for congestion. Negative for trouble swallowing.    Eyes: Negative for photophobia and visual disturbance.   Respiratory: Positive for cough and shortness of breath. Negative for choking.    Cardiovascular: Negative for chest pain and leg swelling.   Gastrointestinal: Negative for abdominal pain, diarrhea, nausea and vomiting.   Endocrine: Negative for cold intolerance and heat intolerance.   Genitourinary: Negative for dysuria and flank pain.   Musculoskeletal: Positive for arthralgias and gait problem.   Skin: Positive for color change and wound.   Allergic/Immunologic: Positive for immunocompromised state.   Neurological: Negative for dizziness, weakness and headaches.   Hematological: Negative for adenopathy.   Psychiatric/Behavioral: Negative for agitation and confusion.        All other systems reviewed and are negative.     Personal History     Past Medical History:   Diagnosis Date   • Acute on chronic systolic congestive heart failure (HCC)    • Chronic pain    • COPD (chronic obstructive pulmonary disease) (HCC)    • Coronary artery disease    • Hyperlipidemia    • Hypertension    • Obesity    • Peripheral vascular disease (HCC)              Past Surgical History:   Procedure Laterality Date   • CHOLECYSTECTOMY     • CORONARY ARTERY BYPASS GRAFT     • OTHER SURGICAL HISTORY      POSTOPERATIVE ARTERIAL EXCISION OF INFECTED GRAFT   • WOUND DEBRIDEMENT      SHARP       Family History:  family history includes Diabetes in her mother; Heart attack in her mother; Hypertension in her mother; Other in her father. Otherwise pertinent FHx was reviewed and unremarkable.     Social History:  reports that she has been smoking cigarettes. She has been smoking about 0.50 packs per day. She has never used smokeless tobacco. She reports that she does not drink alcohol and does not use drugs.  Social History      Social History Narrative   • Not on file       Medications:  HYDROcodone-acetaminophen, amitriptyline, aspirin, atorvastatin, cephalexin, clopidogrel, cyclobenzaprine, furosemide, gabapentin, ipratropium-albuterol, lidocaine, metFORMIN, metoprolol tartrate, oxyCODONE-acetaminophen, pregabalin, and rivaroxaban    No Known Allergies    Objective   Objective     Vital Signs:   Temp:  [97.9 °F (36.6 °C)-98.1 °F (36.7 °C)] 97.9 °F (36.6 °C)  Heart Rate:  [102-107] 107  Resp:  [16-18] 16  BP: (116-131)/(69-76) 127/73  Flow (L/min):  [3] 3    Physical Exam   Constitutional: Awake, alert  Eyes: PERRLA, sclerae anicteric, no conjunctival injection  HENT: NCAT, mucous membranes moist  Neck: Supple, no thyromegaly, no lymphadenopathy, trachea midline  Respiratory: wheezes bilaterally, nonlabored respirations   Cardiovascular: RRR, no murmurs, rubs, or gallops, palpable pedal pulses bilaterally  Gastrointestinal: Positive bowel sounds, soft, nontender, nondistended  Musculoskeletal: trace BLE edema, no clubbing or cyanosis to extremities  Psychiatric: flat  affect, cooperative  Neurologic: Oriented x 3, strength symmetric in all extremities, Cranial Nerves grossly intact to confrontation, speech clear  Skin: bilateral lower extremity ulcerated wounds, legs and feet. Left second toe appears gangrenous. Non tender. Sensation decreased. Pulses faint. Left great toe appears gangrenous.      Results Reviewed:  I have personally reviewed most recent indicated data and agree with findings including:  [x]  Laboratory  []  Radiology  []  EKG/Telemetry  []  Pathology  []  Cardiac/Vascular Studies  [x]  Old records  []  Other:  Most pertinent findings include: vitals stable. Potassium 2.9. alk phos 191. WBC 13.42. COVID PCR positive.      LAB RESULTS:      Lab 06/12/22 1956   WBC 13.42*   HEMOGLOBIN 11.6*   HEMATOCRIT 36.4   PLATELETS 141   NEUTROS ABS 10.75*   IMMATURE GRANS (ABS) 0.12*   LYMPHS ABS 1.74   MONOS ABS 0.74   EOS ABS  0.04   MCV 93.8   SED RATE 45*   CRP 11.65*   PROCALCITONIN 1.02*   LACTATE 2.0         Lab 06/12/22 1956   SODIUM 139   POTASSIUM 2.9*   CHLORIDE 97*   CO2 31.0*   ANION GAP 11.0   BUN 12   CREATININE 0.53*   EGFR 114.2   GLUCOSE 97   CALCIUM 8.7   MAGNESIUM 1.7         Lab 06/12/22 1956   TOTAL PROTEIN 6.8   ALBUMIN 2.70*   GLOBULIN 4.1   ALT (SGPT) 8   AST (SGOT) 23   BILIRUBIN 3.0*   ALK PHOS 191*                     Brief Urine Lab Results     None        Microbiology Results (last 10 days)     Procedure Component Value - Date/Time    Wound Culture - Wound, Foot, Left [[id number]] Collected: 06/12/22 2142    Lab Status: Preliminary result Specimen: Wound from Foot, Left Updated: 06/12/22 2221     Gram Stain No WBCs seen      Moderate (3+) Gram positive cocci      Many (4+) Gram negative bacilli      Few (2+) Gram variable bacilli    Wound Culture - Wound, Foot, Right [[id number]] Collected: 06/12/22 2142    Lab Status: Preliminary result Specimen: Wound from Foot, Right Updated: 06/12/22 2218     Gram Stain Rare (1+) WBCs seen      Few (2+) Gram positive cocci          XR Foot 3+ View Left    Result Date: 6/12/2022  3 views left foot CLINICAL INDICATION: Left foot pain. COMPARISON: None. FINDINGS: Abundant soft tissue swelling is present in the forefoot and about the first through third digits. Scattered subcutaneous gas is seen along the second proximal phalanx and the interspace between the first and second proximal phalanges. A possible acute fracture at seen at the base of the second proximal phalanx. There is osteolysis of the distal aspect of the second proximal phalanx.     Impression: 1. Findings suggestive of soft tissue infection in the forefoot involving the first through third toes. Soft tissue gas may reflect ulceration or subcutaneous abscess. 2. Chronic versus acute on chronic second proximal phalangeal osteomyelitis suspected. Electronically signed by:  Yonny Ybarra M.D.  6/12/2022 9:03 PM  [place] [facility]          Assessment & Plan   Assessment & Plan       Sepsis without acute organ dysfunction, due to unspecified organism (HCC)    Open wounds of both lower extremities    Chronic obstructive pulmonary disease (HCC)    Coronary artery disease    Hypertension    Peripheral vascular disease (HCC)    Diabetes mellitus, type 2 (HCC)    Hypokalemia    Chronic pain    Hyperlipidemia    Obesity    Tobacco abuse      1. Sepsis Secondary to diabetic ulcers:  - obtained blood cultures, ESR, CRP  - will obtain MRI imaging of lower extremities bilaterally  - start zosyn and vancomycin  - consult to wound care  - Vascular surgery, Dr. Portillo  - Infectious disease  - PT/OT  - pain and nausea control  - am labs    2. Acute hypoxic Respiratory Failure  COVID-19 pneumonia  - hypoxic on RA to 75%  - patient insists initial COVID PCR is false positive and refuses to move to COVID unit unless confirmatory tests can corroborate positive result  - repeat COVID-19 PCR, positive here  - will obtain CXR  - CTA chest if ddimer elevated  - routine COVID admission labs  - daily cbc, cmp, crp  - dexamethasone 6 mg IV daily x 10 days  - remdesivir per protocol, pharmacy to dose  - albuterol MDI scheduled Q 4 hours  - delsym Q 12 hours for cough  - tessalon prn cough  - encouraged patient to prone as tolerated  - clear liquid diet  -monitor oxygen requirement and inflammatory markers    3. CAD      HTN      HLD  - history of CABG  - continue ASA, statin, lopressor    4. Chronic Systolic Heart Failure:  - takes lasix 40 mg at home as needed  - appears stable, but awaiting chest imaging and BNP  - daily weights and I&O    5. PVD:  - reported AAA repair ( date deficient)  - on Xarelto. Continue for now    6 . Hypokalemia:  - replace as needed per protocol  - check magnesium  - no EKG changes    7. DM2:  - non insulin dependent  - holding oral hypoglycemics  - check Hba1c was  5.3 back in 2016. Recheck  - SSI with scheduled accu  "checks    8. Tobacco abuse:  - nicotine patch as needed smokes 1/2 PPD.  - plan to discuss cessation      DVT prophylaxis:  xarelto    CODE STATUS:  Full code  Level Of Support Discussed With: Patient  Code Status (Patient has no pulse and is not breathing): CPR (Attempt to Resuscitate)  Medical Interventions (Patient has pulse or is breathing): Full Support      This note has been completed as part of a split-shared workflow.     Electronically signed by Saran Umanzor PA-C, 22, 12:44 AM EDT.      Attending   Admission Attestation       I have seen and examined the patient, performing an independent face-to-face diagnostic evaluation with plan of care reviewed and developed with the advanced practice clinician (APC).      Brief Summary Statement:   Venus Conn is a 46 y.o. female who states that she has had bilateral lower extremity wounds, specifically over the feet and toes, for the last 3-4 weeks.  She states that her  has been \"taking care of them,\" specifically attempting to clean and wrapped them.  3 weeks ago she states that he dressed her foot wounds with Medihoney gel and the patient's dog chewed through to her second toe on the left foot while the patient was asleep; the patient has severe neuropathy with sensation loss of the bilateral lower extremities and was unaware of this until she was awake.  Because the patient's mother was recently , the patient admits she has been depressed and has not sought any medical treatment for any of the aforementioned wounds.  Her  was able to talk her into being seen in the ED today (), which is what prompted her visit here.  She confirms that all of her foot/toe wounds have had purulent and bloody drainage at different times.    Remainder of detailed HPI is as noted by APC and has been reviewed and/or edited by me for completeness.    Attending Physical Exam:  Constitutional: Awake but somnolent.  Answers all questions.  Eyes: " PERRLA, sclerae anicteric, no conjunctival injection  HENT: NCAT, mucous membranes moist.  Neck: Supple, no thyromegaly, no lymphadenopathy, trachea midline  Respiratory: Mild bilateral diffuse wheeze, nonlabored respirations   Cardiovascular: RRR, no murmurs, rubs, or gallops, palpable pedal pulses bilaterally  Gastrointestinal: Positive bowel sounds, soft, nontender, nondistended  Musculoskeletal: Trace bilateral LE edema, no clubbing or cyanosis to extremities  Psychiatric: Flat affect, cooperative but very somnolent, answers questions and follows most commands but falls back to sleep very quickly.  Neurologic: She is oriented to self and to city but could not stay awake to answer all orientation questions, strength symmetric in all extremities, Cranial Nerves grossly intact to confrontation, speech clear  Skin: She has several ulcerated lesions of the distal bilateral lower extremities as well as both of her feet and several on her toes.  The worst appearing is the left first and second toe which both appear gangrenous.  She has almost completely absent sensation from the knees down.    Brief Assessment/Plan :  See detailed assessment and plan developed with APC which I have reviewed and/or edited for completeness.        Admission Status: I believe that this patient meets inpatient criteria due to need for IV antibiotics as well as consultation with vascular surgery regarding possible need for amputation of part of the left foot.  She is also positive for COVID-19 and will need IV steroids and remdesivir.      Demetrius Cuellar,III, DO  06/13/22                          Electronically signed by Demetrius Cuellar III, DO at 06/13/22 0305          Discharge Summaryl      Ami Thomas RN at 06/13/22 0559        Went to check on Pt at 0315, Pt unresponsive, HR dropped to 30s, RRT called, Pt lost pulse and code blue called.     Electronically signed by Ami Thomas RN at 06/13/22 6589      Bianca Gupta RN at 06/13/22 0400        Exam confirms with auscultation zero audible heart tones and zero audible respirations. Ms.Venus Conn was pronounced dead at 0400.  MD notified by Patient's RN.    Bianca Gupta RN  Clinical House Supervisor  6/13/2022 04:26 EDT         Electronically signed by Bianca Gupta RN at 06/13/22 0426     Eber Cuellar IV, PharmD at 06/12/22 2328          Pharmacy Consult-Vancomycin Dosing  Venus Conn is a  46 y.o. female receiving vancomycin therapy.     Indication: SSTI  Consulting Provider: Hospitalist  ID Consult:     Goal AUC: 400 - 600 mg/L*hr    Current Antimicrobial Therapy  Anti-Infectives (From admission, onward)      Ordered     Dose/Rate Route Frequency Start Stop    06/12/22 2328  vancomycin 1250 mg/250 mL 0.9% NS IVPB ([facility])        Ordering Provider: Eber Cuellar IV, PharmD    15 mg/kg × 86.2 kg  over 90 Minutes Intravenous Every 12 Hours 06/13/22 1200 06/20/22 1159    06/12/22 2320  piperacillin-tazobactam (ZOSYN) 3.375 g in iso-osmotic dextrose 50 ml (premix)        Ordering Provider: Saran Umanzor PA-C    3.375 g  over 4 Hours Intravenous Every 8 Hours 06/13/22 0600 06/20/22 0559    06/12/22 2319  Pharmacy to dose vancomycin        Ordering Provider: Saran Umanzor PA-C     Does not apply Continuous PRN 06/12/22 2319 06/19/22 2318    06/12/22 2101  piperacillin-tazobactam (ZOSYN) 4.5 g in iso-osmotic dextrose 100 mL IVPB (premix)        Ordering Provider: Adalid Robins PA-C    4.5 g  over 30 Minutes Intravenous Once 06/12/22 2103 06/12/22 2230    06/12/22 2058  vancomycin 1750 mg/500 mL 0.9% NS IVPB ([facility])        Ordering Provider: Adalid Robins PA-C    20 mg/kg × 82.1 kg  over 2 Hours Intravenous Once 06/12/22 2100              Allergies  Allergies as of 06/12/2022   • (No Known Allergies)       Labs    Results from last 7 days   Lab Units 06/12/22 1956   BUN mg/dL 12   CREATININE mg/dL 0.53*  "      Results from last 7 days   Lab Units 22   WBC 10*3/mm3 13.42*       Evaluation of Dosing     Last Dose Received in the ED/Outside Facility: 1750 mg infusing  Is Patient on Dialysis or Renal Replacement: N    Ht - 167.6 cm (66\")  Wt - 86.2 kg (190 lb 1.6 oz)    Estimated Creatinine Clearance: 143.7 mL/min (A) (by C-G formula based on SCr of 0.53 mg/dL (L)).    Intake & Output (last 3 days)       None            Microbiology and Radiology  Microbiology Results (last 10 days)       Procedure Component Value - Date/Time    Wound Culture - Wound, Foot, Left [[id number]] Collected: 22    Lab Status: Preliminary result Specimen: Wound from Foot, Left Updated: 22     Gram Stain No WBCs seen      Moderate (3+) Gram positive cocci      Many (4+) Gram negative bacilli      Few (2+) Gram variable bacilli    Wound Culture - Wound, Foot, Right [[id number]] Collected: 22    Lab Status: Preliminary result Specimen: Wound from Foot, Right Updated: 22     Gram Stain Rare (1+) WBCs seen      Few (2+) Gram positive cocci            Reported Vancomycin Levels                         Insight[employer] AUC Calculation:    Current AUC:    mg/L*hr    Predicted Steady State AUC on Current Dose:  mg/L*hr  _________________________________    Predicted Steady State AUC on New Dose:   464 mg/L*hr    Assessment/Plan:    Moderate age obese female with low SCr reflective of habitus.  1750 mg IV vancomycin infusing.  Start 1250 mg IV vancomycin q12h at noon.    Random level with AM labs Tuesday.    Thank you for this consult.  Eber Cuellar IV, PharmD, BCPS  2022  23:28 EDT       Electronically signed by Eber Cuellar IV, PharmD at 22     Demetrius Cuellar III, DO at 22              [facility] Medicine Services  HISTORY AND PHYSICAL    Patient Name: Venus Conn  : 1975  MRN: [id number]  Primary Care Physician: Damon, " Thalia HUYNH, APRN  Date of admission: 6/12/2022    Subjective   Subjective     Chief Complaint:  Lower extremity wound    HPI:  Venus Conn is a 46 y.o. female with a past medical history significant for chronic respiratory failure with COPD and ongoing tobacco abuse on 2L NC as needed, hypertension, HLD, chronic systolic heart failure, CAD s/p CABG, PVD chronically anticoagulated on Xarelto, DM2, and chronic pain. She presents today with complaints of bilateral lower extremity wounds which she first noticed approximately one month ago. Wounds have progressively worsened over that time despite  cleaning and wrapping them. Wounds are foul smelling and there is drainage of blood and purulent fluid. Patient adds that she has significant neuropathy related to DM and has almost zero sensation in lower extremities. States 3 weeks ago  dressed her wounds with Medihoney. Later that evening, the patient fell asleep with her feet exposed and the puppies chewed at the dressing and ate through to her left second toe. There is subsequent increased redness and swelling around the toe and left food with what appears to be gangrenous tissue. Patient has refused medical evaluation of wounds until now.  reports her mother passed away a week ago and the patient has been depressed. She is now ready for treatment and understands that may require amputation. Of note, patient has been dyspneic, notes cough and congestion. Has been requiring use of supplemental more frequently.   Currently there are no complaints of falls or trauma. No fevers, chest pain, or syncope. No dysuria or flank pain. No history of COVID-19. She denies sick contacts. Will admit to inpatient for further evaluation and treatment.      Review of Systems   Constitutional: Negative for chills, fatigue and fever.   HENT: Positive for congestion. Negative for trouble swallowing.    Eyes: Negative for photophobia and visual disturbance.   Respiratory:  Positive for cough and shortness of breath. Negative for choking.    Cardiovascular: Negative for chest pain and leg swelling.   Gastrointestinal: Negative for abdominal pain, diarrhea, nausea and vomiting.   Endocrine: Negative for cold intolerance and heat intolerance.   Genitourinary: Negative for dysuria and flank pain.   Musculoskeletal: Positive for arthralgias and gait problem.   Skin: Positive for color change and wound.   Allergic/Immunologic: Positive for immunocompromised state.   Neurological: Negative for dizziness, weakness and headaches.   Hematological: Negative for adenopathy.   Psychiatric/Behavioral: Negative for agitation and confusion.        All other systems reviewed and are negative.     Personal History     Past Medical History:   Diagnosis Date   • Acute on chronic systolic congestive heart failure (HCC)    • Chronic pain    • COPD (chronic obstructive pulmonary disease) (HCC)    • Coronary artery disease    • Hyperlipidemia    • Hypertension    • Obesity    • Peripheral vascular disease (HCC)              Past Surgical History:   Procedure Laterality Date   • CHOLECYSTECTOMY     • CORONARY ARTERY BYPASS GRAFT     • OTHER SURGICAL HISTORY      POSTOPERATIVE ARTERIAL EXCISION OF INFECTED GRAFT   • WOUND DEBRIDEMENT      SHARP       Family History:  family history includes Diabetes in her mother; Heart attack in her mother; Hypertension in her mother; Other in her father. Otherwise pertinent FHx was reviewed and unremarkable.     Social History:  reports that she has been smoking cigarettes. She has been smoking about 0.50 packs per day. She has never used smokeless tobacco. She reports that she does not drink alcohol and does not use drugs.  Social History     Social History Narrative   • Not on file       Medications:  HYDROcodone-acetaminophen, amitriptyline, aspirin, atorvastatin, cephalexin, clopidogrel, cyclobenzaprine, furosemide, gabapentin, ipratropium-albuterol, lidocaine,  metFORMIN, metoprolol tartrate, oxyCODONE-acetaminophen, pregabalin, and rivaroxaban    No Known Allergies    Objective   Objective     Vital Signs:   Temp:  [97.9 °F (36.6 °C)-98.1 °F (36.7 °C)] 97.9 °F (36.6 °C)  Heart Rate:  [102-107] 107  Resp:  [16-18] 16  BP: (116-131)/(69-76) 127/73  Flow (L/min):  [3] 3    Physical Exam   Constitutional: Awake, alert  Eyes: PERRLA, sclerae anicteric, no conjunctival injection  HENT: NCAT, mucous membranes moist  Neck: Supple, no thyromegaly, no lymphadenopathy, trachea midline  Respiratory: wheezes bilaterally, nonlabored respirations   Cardiovascular: RRR, no murmurs, rubs, or gallops, palpable pedal pulses bilaterally  Gastrointestinal: Positive bowel sounds, soft, nontender, nondistended  Musculoskeletal: trace BLE edema, no clubbing or cyanosis to extremities  Psychiatric: flat  affect, cooperative  Neurologic: Oriented x 3, strength symmetric in all extremities, Cranial Nerves grossly intact to confrontation, speech clear  Skin: bilateral lower extremity ulcerated wounds, legs and feet. Left second toe appears gangrenous. Non tender. Sensation decreased. Pulses faint. Left great toe appears gangrenous.      Results Reviewed:  I have personally reviewed most recent indicated data and agree with findings including:  [x]  Laboratory  []  Radiology  []  EKG/Telemetry  []  Pathology  []  Cardiac/Vascular Studies  [x]  Old records  []  Other:  Most pertinent findings include: vitals stable. Potassium 2.9. alk phos 191. WBC 13.42. COVID PCR positive.      LAB RESULTS:      Lab 06/12/22 1956   WBC 13.42*   HEMOGLOBIN 11.6*   HEMATOCRIT 36.4   PLATELETS 141   NEUTROS ABS 10.75*   IMMATURE GRANS (ABS) 0.12*   LYMPHS ABS 1.74   MONOS ABS 0.74   EOS ABS 0.04   MCV 93.8   SED RATE 45*   CRP 11.65*   PROCALCITONIN 1.02*   LACTATE 2.0         Lab 06/12/22 1956   SODIUM 139   POTASSIUM 2.9*   CHLORIDE 97*   CO2 31.0*   ANION GAP 11.0   BUN 12   CREATININE 0.53*   EGFR 114.2   GLUCOSE  97   CALCIUM 8.7   MAGNESIUM 1.7         Lab 06/12/22 1956   TOTAL PROTEIN 6.8   ALBUMIN 2.70*   GLOBULIN 4.1   ALT (SGPT) 8   AST (SGOT) 23   BILIRUBIN 3.0*   ALK PHOS 191*                     Brief Urine Lab Results     None        Microbiology Results (last 10 days)     Procedure Component Value - Date/Time    Wound Culture - Wound, Foot, Left [[id number]] Collected: 06/12/22 2142    Lab Status: Preliminary result Specimen: Wound from Foot, Left Updated: 06/12/22 2221     Gram Stain No WBCs seen      Moderate (3+) Gram positive cocci      Many (4+) Gram negative bacilli      Few (2+) Gram variable bacilli    Wound Culture - Wound, Foot, Right [[id number]] Collected: 06/12/22 2142    Lab Status: Preliminary result Specimen: Wound from Foot, Right Updated: 06/12/22 2218     Gram Stain Rare (1+) WBCs seen      Few (2+) Gram positive cocci          XR Foot 3+ View Left    Result Date: 6/12/2022  3 views left foot CLINICAL INDICATION: Left foot pain. COMPARISON: None. FINDINGS: Abundant soft tissue swelling is present in the forefoot and about the first through third digits. Scattered subcutaneous gas is seen along the second proximal phalanx and the interspace between the first and second proximal phalanges. A possible acute fracture at seen at the base of the second proximal phalanx. There is osteolysis of the distal aspect of the second proximal phalanx.     Impression: 1. Findings suggestive of soft tissue infection in the forefoot involving the first through third toes. Soft tissue gas may reflect ulceration or subcutaneous abscess. 2. Chronic versus acute on chronic second proximal phalangeal osteomyelitis suspected. Electronically signed by:  Yonny Ybarra M.D.  6/12/2022 9:03 PM Mountain Time          Assessment & Plan   Assessment & Plan       Sepsis without acute organ dysfunction, due to unspecified organism (HCC)    Open wounds of both lower extremities    Chronic obstructive pulmonary disease (HCC)     Coronary artery disease    Hypertension    Peripheral vascular disease (HCC)    Diabetes mellitus, type 2 (HCC)    Hypokalemia    Chronic pain    Hyperlipidemia    Obesity    Tobacco abuse      1. Sepsis Secondary to diabetic ulcers:  - obtained blood cultures, ESR, CRP  - will obtain MRI imaging of lower extremities bilaterally  - start zosyn and vancomycin  - consult to wound care  - Vascular surgery, Dr. Portillo  - Infectious disease  - PT/OT  - pain and nausea control  - am labs    2. Acute hypoxic Respiratory Failure  COVID-19 pneumonia  - hypoxic on RA to 75%  - patient insists initial COVID PCR is false positive and refuses to move to COVID unit unless confirmatory tests can corroborate positive result  - repeat COVID-19 PCR, positive here  - will obtain CXR  - CTA chest if ddimer elevated  - routine COVID admission labs  - daily cbc, cmp, crp  - dexamethasone 6 mg IV daily x 10 days  - remdesivir per protocol, pharmacy to dose  - albuterol MDI scheduled Q 4 hours  - delsym Q 12 hours for cough  - tessalon prn cough  - encouraged patient to prone as tolerated  - clear liquid diet  -monitor oxygen requirement and inflammatory markers    3. CAD      HTN      HLD  - history of CABG  - continue ASA, statin, lopressor    4. Chronic Systolic Heart Failure:  - takes lasix 40 mg at home as needed  - appears stable, but awaiting chest imaging and BNP  - daily weights and I&O    5. PVD:  - reported AAA repair ( date deficient)  - on Xarelto. Continue for now    6 . Hypokalemia:  - replace as needed per protocol  - check magnesium  - no EKG changes    7. DM2:  - non insulin dependent  - holding oral hypoglycemics  - check Hba1c was  5.3 back in 2016. Recheck  - SSI with scheduled accu checks    8. Tobacco abuse:  - nicotine patch as needed smokes 1/2 PPD.  - plan to discuss cessation      DVT prophylaxis:  xarelto    CODE STATUS:  Full code  Level Of Support Discussed With: Patient  Code Status (Patient has no pulse and  "is not breathing): CPR (Attempt to Resuscitate)  Medical Interventions (Patient has pulse or is breathing): Full Support      This note has been completed as part of a split-shared workflow.     Electronically signed by Saran Umanzor PA-C, 22, 12:44 AM EDT.      Attending   Admission Attestation       I have seen and examined the patient, performing an independent face-to-face diagnostic evaluation with plan of care reviewed and developed with the advanced practice clinician (APC).      Brief Summary Statement:   Venus Conn is a 46 y.o. female who states that she has had bilateral lower extremity wounds, specifically over the feet and toes, for the last 3-4 weeks.  She states that her  has been \"taking care of them,\" specifically attempting to clean and wrapped them.  3 weeks ago she states that he dressed her foot wounds with Medihoney gel and the patient's dog chewed through to her second toe on the left foot while the patient was asleep; the patient has severe neuropathy with sensation loss of the bilateral lower extremities and was unaware of this until she was awake.  Because the patient's mother was recently , the patient admits she has been depressed and has not sought any medical treatment for any of the aforementioned wounds.  Her  was able to talk her into being seen in the ED today (), which is what prompted her visit here.  She confirms that all of her foot/toe wounds have had purulent and bloody drainage at different times.    Remainder of detailed HPI is as noted by APC and has been reviewed and/or edited by me for completeness.    Attending Physical Exam:  Constitutional: Awake but somnolent.  Answers all questions.  Eyes: PERRLA, sclerae anicteric, no conjunctival injection  HENT: NCAT, mucous membranes moist.  Neck: Supple, no thyromegaly, no lymphadenopathy, trachea midline  Respiratory: Mild bilateral diffuse wheeze, nonlabored respirations "   Cardiovascular: RRR, no murmurs, rubs, or gallops, palpable pedal pulses bilaterally  Gastrointestinal: Positive bowel sounds, soft, nontender, nondistended  Musculoskeletal: Trace bilateral LE edema, no clubbing or cyanosis to extremities  Psychiatric: Flat affect, cooperative but very somnolent, answers questions and follows most commands but falls back to sleep very quickly.  Neurologic: She is oriented to self and to city but could not stay awake to answer all orientation questions, strength symmetric in all extremities, Cranial Nerves grossly intact to confrontation, speech clear  Skin: She has several ulcerated lesions of the distal bilateral lower extremities as well as both of her feet and several on her toes.  The worst appearing is the left first and second toe which both appear gangrenous.  She has almost completely absent sensation from the knees down.    Brief Assessment/Plan :  See detailed assessment and plan developed with APC which I have reviewed and/or edited for completeness.        Admission Status: I believe that this patient meets inpatient criteria due to need for IV antibiotics as well as consultation with vascular surgery regarding possible need for amputation of part of the left foot.  She is also positive for COVID-19 and will need IV steroids and remdesivir.      Demetrius Cuellar III, DO  22                          Electronically signed by Demetrius Cuellar III, DO at 22 030     Adalid Robins PA-C at 22 1953     Attestation signed by Mirza Clemons MD at 22 0910        SUPERVISE: For this patient encounter, I reviewed the APC's documentation, treatment plan, and medical decision making.  Mirza Clemons MD 2022 23:03 EDT                          EMERGENCY DEPARTMENT ENCOUNTER    Pt Name: Venus Conn  MRN: [id number]  Pt :   1975  Room Number:    Date of encounter:  2022  PCP: Thalia Jose,  APRN  ED Provider: Adalid Robins PA-C    Historian: Patient's .      HPI:  Chief Complaint: Multiple ulcers and dog bites, cellulitis to bilateral feet, patient is diabetic with peripheral vascular disease        Context: Venus Conn is a 46 y.o. female who presents to the ED c/o Ultibro infected ulcers and wounds to bilateral feet.  Patient is a non-insulin-dependent diabetic with a history of peripheral vascular disease.  Patient states they have had puppies at home that have been chewing on the patient's left foot.  According to her , patient fell asleep, due to her diabetic neuropathy she was unable to feel the puppy chewing on her feet.  This was 3 weeks ago.  She also has ulcerative wounds on bilateral feet.  She has swelling and what appears to be gangrenous tissue at the base of her great toe on her left foot.      PAST MEDICAL HISTORY  Past Medical History:   Diagnosis Date   • Acute on chronic systolic congestive heart failure (HCC)    • Chronic pain    • COPD (chronic obstructive pulmonary disease) (HCC)    • Coronary artery disease    • Hyperlipidemia    • Hypertension    • Obesity    • Peripheral vascular disease (HCC)          PAST SURGICAL HISTORY  Past Surgical History:   Procedure Laterality Date   • CHOLECYSTECTOMY     • CORONARY ARTERY BYPASS GRAFT     • OTHER SURGICAL HISTORY      POSTOPERATIVE ARTERIAL EXCISION OF INFECTED GRAFT   • WOUND DEBRIDEMENT      SHARP         FAMILY HISTORY  Family History   Problem Relation Age of Onset   • Diabetes Mother    • Hypertension Mother    • Heart attack Mother    • Other Father         malignant neoplasm         SOCIAL HISTORY  Social History     Socioeconomic History   • Marital status:    Tobacco Use   • Smoking status: Current Every Day Smoker     Packs/day: 0.50     Types: Cigarettes     Last attempt to quit: 2015     Years since quittin.9   • Smokeless tobacco: Never Used   Substance and Sexual Activity   •  Alcohol use: No   • Drug use: Never   • Sexual activity: Defer         ALLERGIES  Patient has no known allergies.        REVIEW OF SYSTEMS  Review of Systems   Constitutional: Positive for activity change, chills, fatigue and fever.   HENT: Negative for congestion, rhinorrhea and sore throat.    Respiratory: Negative for cough, shortness of breath and wheezing.    Cardiovascular: Negative for chest pain and palpitations.   Gastrointestinal: Negative for abdominal pain, nausea and vomiting.   Musculoskeletal: Positive for arthralgias. Negative for back pain, myalgias and neck pain.   Skin: Positive for wound (Multiple infected wounds to bilateral feet and ankles).          All systems reviewed and negative except for those discussed in HPI.       PHYSICAL EXAM    I have reviewed the triage vital signs and nursing notes.    ED Triage Vitals [06/12/22 1949]   Temp Heart Rate Resp BP SpO2   98.1 °F (36.7 °C) 106 18 131/69 96 %      Temp src Heart Rate Source Patient Position BP Location FiO2 (%)   Oral Monitor Sitting Right arm --       Physical Exam  GENERAL:   Tearful, appears to be in significant mount of discomfort, history provided by patient's   HENT: Nares patent.  EYES: No scleral icterus.  CV: Regular rhythm, regular rate.  RESPIRATORY: Normal effort.  No audible wheezes, rales or rhonchi.  ABDOMEN: Soft, nontender  MUSCULOSKELETAL: Multiple shallow ulcerative lesions to bilateral feet.  Right ankle with a 5 cm shallow beefy red ulcerative lesion to the lateral aspect of the ankle wounds on the right foot have embedded dog hair and what appears to be fecal material.  The medial aspect of the right great toe at the level of the end TP joint has a 3 x 4 cm beefy red shallow ulcerative wound with surrounding erythema that also appears to have hair embedded in the wound.  The left foot has a large swelling gangrenous area just at the base of the great toe, plantar surface that is exquisitely tender to  palpation.  The second toe has a shallow ulcerative wound that comprises about the top 50% of the toe that the patient's  tells me is where the dog was biting her toe.  NEURO: Alert, moves all extremities, follows commands.  SKIN: Warm, dry, no rash visualized.        LAB RESULTS  Recent Results (from the past 24 hour(s))   Comprehensive Metabolic Panel    Collection Time: 06/12/22  7:56 PM    Specimen: Blood   Result Value Ref Range    Glucose 97 65 - 99 mg/dL    BUN 12 6 - 20 mg/dL    Creatinine 0.53 (L) 0.57 - 1.00 mg/dL    Sodium 139 136 - 145 mmol/L    Potassium 2.9 (L) 3.5 - 5.2 mmol/L    Chloride 97 (L) 98 - 107 mmol/L    CO2 31.0 (H) 22.0 - 29.0 mmol/L    Calcium 8.7 8.6 - 10.5 mg/dL    Total Protein 6.8 6.0 - 8.5 g/dL    Albumin 2.70 (L) 3.50 - 5.20 g/dL    ALT (SGPT) 8 1 - 33 U/L    AST (SGOT) 23 1 - 32 U/L    Alkaline Phosphatase 191 (H) 39 - 117 U/L    Total Bilirubin 3.0 (H) 0.0 - 1.2 mg/dL    Globulin 4.1 gm/dL    A/G Ratio 0.7 g/dL    BUN/Creatinine Ratio 22.6 7.0 - 25.0    Anion Gap 11.0 5.0 - 15.0 mmol/L    eGFR 114.2 >60.0 mL/min/1.73   Lactic Acid, Plasma    Collection Time: 06/12/22  7:56 PM    Specimen: Blood   Result Value Ref Range    Lactate 2.0 0.5 - 2.0 mmol/L   CBC Auto Differential    Collection Time: 06/12/22  7:56 PM    Specimen: Blood   Result Value Ref Range    WBC 13.42 (H) 3.40 - 10.80 10*3/mm3    RBC 3.88 3.77 - 5.28 10*6/mm3    Hemoglobin 11.6 (L) 12.0 - 15.9 g/dL    Hematocrit 36.4 34.0 - 46.6 %    MCV 93.8 79.0 - 97.0 fL    MCH 29.9 26.6 - 33.0 pg    MCHC 31.9 31.5 - 35.7 g/dL    RDW 18.8 (H) 12.3 - 15.4 %    RDW-SD 65.2 (H) 37.0 - 54.0 fl    MPV 10.7 6.0 - 12.0 fL    Platelets 141 140 - 450 10*3/mm3    Neutrophil % 80.1 (H) 42.7 - 76.0 %    Lymphocyte % 13.0 (L) 19.6 - 45.3 %    Monocyte % 5.5 5.0 - 12.0 %    Eosinophil % 0.3 0.3 - 6.2 %    Basophil % 0.2 0.0 - 1.5 %    Immature Grans % 0.9 (H) 0.0 - 0.5 %    Neutrophils, Absolute 10.75 (H) 1.70 - 7.00 10*3/mm3     Lymphocytes, Absolute 1.74 0.70 - 3.10 10*3/mm3    Monocytes, Absolute 0.74 0.10 - 0.90 10*3/mm3    Eosinophils, Absolute 0.04 0.00 - 0.40 10*3/mm3    Basophils, Absolute 0.03 0.00 - 0.20 10*3/mm3    Immature Grans, Absolute 0.12 (H) 0.00 - 0.05 10*3/mm3    nRBC 0.0 0.0 - 0.2 /100 WBC   Green Top (Gel)    Collection Time: 06/12/22  7:56 PM   Result Value Ref Range    Extra Tube Hold for add-ons.    Lavender Top    Collection Time: 06/12/22  7:56 PM   Result Value Ref Range    Extra Tube hold for add-on    Gold Top - SST    Collection Time: 06/12/22  7:56 PM   Result Value Ref Range    Extra Tube Hold for add-ons.    Light Blue Top    Collection Time: 06/12/22  7:56 PM   Result Value Ref Range    Extra Tube Hold for add-ons.    Procalcitonin    Collection Time: 06/12/22  7:56 PM    Specimen: Blood   Result Value Ref Range    Procalcitonin 1.02 (H) 0.00 - 0.25 ng/mL   C-reactive Protein    Collection Time: 06/12/22  7:56 PM    Specimen: Blood   Result Value Ref Range    C-Reactive Protein 11.65 (H) 0.00 - 0.50 mg/dL   Sedimentation Rate    Collection Time: 06/12/22  7:56 PM    Specimen: Blood   Result Value Ref Range    Sed Rate 45 (H) 0 - 20 mm/hr   Magnesium    Collection Time: 06/12/22  7:56 PM    Specimen: Blood   Result Value Ref Range    Magnesium 1.7 1.6 - 2.6 mg/dL       If labs were ordered, I independently reviewed the results.        RADIOLOGY  No Radiology Exams Resulted Within Past 24 Hours      PROCEDURES    Procedures    No orders to display       MEDICATIONS GIVEN IN ER    Medications   sodium chloride 0.9 % flush 10 mL (has no administration in time range)   vancomycin 1750 mg/500 mL 0.9% NS IVPB ([facility]) (has no administration in time range)   piperacillin-tazobactam (ZOSYN) 4.5 g in iso-osmotic dextrose 100 mL IVPB (premix) (4.5 g Intravenous New Bag 6/12/22 0443)   sodium chloride 0.9 % bolus 1,000 mL (1,000 mL Intravenous New Bag 6/12/22 1693)   HYDROmorphone (DILAUDID) injection 0.5 mg (0.5 mg  Intravenous Given 6/12/22 2139)   potassium chloride (MICRO-K) CR capsule 20 mEq (has no administration in time range)   ondansetron (ZOFRAN) injection 4 mg (4 mg Intravenous Given 6/12/22 2139)         PROGRESS, DATA ANALYSIS, CONSULTS, AND MEDICAL DECISION MAKING    All labs have been independently reviewed by me.  All radiology studies have been reviewed by me and the radiologist dictating the report.   EKG's have been independently viewed and interpreted by me.      Differential diagnoses: Cellulitis, osteomyelitis, sepsis      ED Course as of 06/12/22 2205   Sun Jun 12, 2022 2018 Sed Rate(!): 45 [TG]   2058 C-Reactive Protein(!): 11.65 [TG]   2058 Procalcitonin(!): 1.02 [TG]   2156 Potassium(!): 2.9 [TG]   2157 WBC(!): 13.42 [TG]      ED Course User Index  [TG] Adalid Robins PA-C     Wounds were cleansed, visible foreign bodies (dog hair and suspected fecal material) removed.  Wound to be covered with nonstick dressing once seen by hospitalist.  Vancomycin and Zosyn initiated here in the emergency department.  We will seek admission to hospitalist service.      AS OF 22:05 EDT VITALS:    BP - 131/69  HR - 106  TEMP - 98.1 °F (36.7 °C) (Oral)  O2 SATS - 96%                  DIAGNOSIS  Final diagnoses:   Sepsis without acute organ dysfunction, due to unspecified organism (HCC)   Gangrene of left foot (HCC)   Open wound of left great toe due to dog bite   Non-insulin dependent type 2 diabetes mellitus (HCC)   Peripheral vascular disease (HCC)   Hypokalemia         DISPOSITION  Admit to hospitalist service           dAalid Robins PA-C  06/12/22 2205      Electronically signed by Mirza Clemons MD at 06/12/22 2303          Emergency Department Notes      Adalid Robins PA-C at 06/12/22 1953     Attestation signed by Mirza Clemons MD at 06/12/22 2303        SUPERVISE: For this patient encounter, I reviewed the APC's documentation, treatment plan, and medical decision  anabel.  Mirza Clemons MD 2022 23:03 EDT                          EMERGENCY DEPARTMENT ENCOUNTER    Pt Name: Venus Conn  MRN: [id number]  Pt :   1975  Room Number:    Date of encounter:  2022  PCP: Thalia Jose APRN  ED Provider: Adalid Robins PA-C    Historian: Patient's .      HPI:  Chief Complaint: Multiple ulcers and dog bites, cellulitis to bilateral feet, patient is diabetic with peripheral vascular disease        Context: Venus Conn is a 46 y.o. female who presents to the ED c/o Ultibro infected ulcers and wounds to bilateral feet.  Patient is a non-insulin-dependent diabetic with a history of peripheral vascular disease.  Patient states they have had puppies at home that have been chewing on the patient's left foot.  According to her , patient fell asleep, due to her diabetic neuropathy she was unable to feel the puppy chewing on her feet.  This was 3 weeks ago.  She also has ulcerative wounds on bilateral feet.  She has swelling and what appears to be gangrenous tissue at the base of her great toe on her left foot.      PAST MEDICAL HISTORY  Past Medical History:   Diagnosis Date   • Acute on chronic systolic congestive heart failure (HCC)    • Chronic pain    • COPD (chronic obstructive pulmonary disease) (HCC)    • Coronary artery disease    • Hyperlipidemia    • Hypertension    • Obesity    • Peripheral vascular disease (HCC)          PAST SURGICAL HISTORY  Past Surgical History:   Procedure Laterality Date   • CHOLECYSTECTOMY     • CORONARY ARTERY BYPASS GRAFT     • OTHER SURGICAL HISTORY      POSTOPERATIVE ARTERIAL EXCISION OF INFECTED GRAFT   • WOUND DEBRIDEMENT      SHARP         FAMILY HISTORY  Family History   Problem Relation Age of Onset   • Diabetes Mother    • Hypertension Mother    • Heart attack Mother    • Other Father         malignant neoplasm         SOCIAL HISTORY  Social History     Socioeconomic History   • Marital  status:    Tobacco Use   • Smoking status: Current Every Day Smoker     Packs/day: 0.50     Types: Cigarettes     Last attempt to quit: 2015     Years since quittin.9   • Smokeless tobacco: Never Used   Substance and Sexual Activity   • Alcohol use: No   • Drug use: Never   • Sexual activity: Defer         ALLERGIES  Patient has no known allergies.        REVIEW OF SYSTEMS  Review of Systems   Constitutional: Positive for activity change, chills, fatigue and fever.   HENT: Negative for congestion, rhinorrhea and sore throat.    Respiratory: Negative for cough, shortness of breath and wheezing.    Cardiovascular: Negative for chest pain and palpitations.   Gastrointestinal: Negative for abdominal pain, nausea and vomiting.   Musculoskeletal: Positive for arthralgias. Negative for back pain, myalgias and neck pain.   Skin: Positive for wound (Multiple infected wounds to bilateral feet and ankles).          All systems reviewed and negative except for those discussed in HPI.       PHYSICAL EXAM    I have reviewed the triage vital signs and nursing notes.    ED Triage Vitals [22]   Temp Heart Rate Resp BP SpO2   98.1 °F (36.7 °C) 106 18 131/69 96 %      Temp src Heart Rate Source Patient Position BP Location FiO2 (%)   Oral Monitor Sitting Right arm --       Physical Exam  GENERAL:   Tearful, appears to be in significant mount of discomfort, history provided by patient's   HENT: Nares patent.  EYES: No scleral icterus.  CV: Regular rhythm, regular rate.  RESPIRATORY: Normal effort.  No audible wheezes, rales or rhonchi.  ABDOMEN: Soft, nontender  MUSCULOSKELETAL: Multiple shallow ulcerative lesions to bilateral feet.  Right ankle with a 5 cm shallow beefy red ulcerative lesion to the lateral aspect of the ankle wounds on the right foot have embedded dog hair and what appears to be fecal material.  The medial aspect of the right great toe at the level of the end TP joint has a 3 x 4 cm  beefy red shallow ulcerative wound with surrounding erythema that also appears to have hair embedded in the wound.  The left foot has a large swelling gangrenous area just at the base of the great toe, plantar surface that is exquisitely tender to palpation.  The second toe has a shallow ulcerative wound that comprises about the top 50% of the toe that the patient's  tells me is where the dog was biting her toe.  NEURO: Alert, moves all extremities, follows commands.  SKIN: Warm, dry, no rash visualized.        LAB RESULTS  Recent Results (from the past 24 hour(s))   Comprehensive Metabolic Panel    Collection Time: 06/12/22  7:56 PM    Specimen: Blood   Result Value Ref Range    Glucose 97 65 - 99 mg/dL    BUN 12 6 - 20 mg/dL    Creatinine 0.53 (L) 0.57 - 1.00 mg/dL    Sodium 139 136 - 145 mmol/L    Potassium 2.9 (L) 3.5 - 5.2 mmol/L    Chloride 97 (L) 98 - 107 mmol/L    CO2 31.0 (H) 22.0 - 29.0 mmol/L    Calcium 8.7 8.6 - 10.5 mg/dL    Total Protein 6.8 6.0 - 8.5 g/dL    Albumin 2.70 (L) 3.50 - 5.20 g/dL    ALT (SGPT) 8 1 - 33 U/L    AST (SGOT) 23 1 - 32 U/L    Alkaline Phosphatase 191 (H) 39 - 117 U/L    Total Bilirubin 3.0 (H) 0.0 - 1.2 mg/dL    Globulin 4.1 gm/dL    A/G Ratio 0.7 g/dL    BUN/Creatinine Ratio 22.6 7.0 - 25.0    Anion Gap 11.0 5.0 - 15.0 mmol/L    eGFR 114.2 >60.0 mL/min/1.73   Lactic Acid, Plasma    Collection Time: 06/12/22  7:56 PM    Specimen: Blood   Result Value Ref Range    Lactate 2.0 0.5 - 2.0 mmol/L   CBC Auto Differential    Collection Time: 06/12/22  7:56 PM    Specimen: Blood   Result Value Ref Range    WBC 13.42 (H) 3.40 - 10.80 10*3/mm3    RBC 3.88 3.77 - 5.28 10*6/mm3    Hemoglobin 11.6 (L) 12.0 - 15.9 g/dL    Hematocrit 36.4 34.0 - 46.6 %    MCV 93.8 79.0 - 97.0 fL    MCH 29.9 26.6 - 33.0 pg    MCHC 31.9 31.5 - 35.7 g/dL    RDW 18.8 (H) 12.3 - 15.4 %    RDW-SD 65.2 (H) 37.0 - 54.0 fl    MPV 10.7 6.0 - 12.0 fL    Platelets 141 140 - 450 10*3/mm3    Neutrophil % 80.1 (H)  42.7 - 76.0 %    Lymphocyte % 13.0 (L) 19.6 - 45.3 %    Monocyte % 5.5 5.0 - 12.0 %    Eosinophil % 0.3 0.3 - 6.2 %    Basophil % 0.2 0.0 - 1.5 %    Immature Grans % 0.9 (H) 0.0 - 0.5 %    Neutrophils, Absolute 10.75 (H) 1.70 - 7.00 10*3/mm3    Lymphocytes, Absolute 1.74 0.70 - 3.10 10*3/mm3    Monocytes, Absolute 0.74 0.10 - 0.90 10*3/mm3    Eosinophils, Absolute 0.04 0.00 - 0.40 10*3/mm3    Basophils, Absolute 0.03 0.00 - 0.20 10*3/mm3    Immature Grans, Absolute 0.12 (H) 0.00 - 0.05 10*3/mm3    nRBC 0.0 0.0 - 0.2 /100 WBC   Green Top (Gel)    Collection Time: 06/12/22  7:56 PM   Result Value Ref Range    Extra Tube Hold for add-ons.    Lavender Top    Collection Time: 06/12/22  7:56 PM   Result Value Ref Range    Extra Tube hold for add-on    Gold Top - SST    Collection Time: 06/12/22  7:56 PM   Result Value Ref Range    Extra Tube Hold for add-ons.    Light Blue Top    Collection Time: 06/12/22  7:56 PM   Result Value Ref Range    Extra Tube Hold for add-ons.    Procalcitonin    Collection Time: 06/12/22  7:56 PM    Specimen: Blood   Result Value Ref Range    Procalcitonin 1.02 (H) 0.00 - 0.25 ng/mL   C-reactive Protein    Collection Time: 06/12/22  7:56 PM    Specimen: Blood   Result Value Ref Range    C-Reactive Protein 11.65 (H) 0.00 - 0.50 mg/dL   Sedimentation Rate    Collection Time: 06/12/22  7:56 PM    Specimen: Blood   Result Value Ref Range    Sed Rate 45 (H) 0 - 20 mm/hr   Magnesium    Collection Time: 06/12/22  7:56 PM    Specimen: Blood   Result Value Ref Range    Magnesium 1.7 1.6 - 2.6 mg/dL       If labs were ordered, I independently reviewed the results.        RADIOLOGY  No Radiology Exams Resulted Within Past 24 Hours      PROCEDURES    Procedures    No orders to display       MEDICATIONS GIVEN IN ER    Medications   sodium chloride 0.9 % flush 10 mL (has no administration in time range)   vancomycin 1750 mg/500 mL 0.9% NS IVPB ([facility]) (has no administration in time range)    piperacillin-tazobactam (ZOSYN) 4.5 g in iso-osmotic dextrose 100 mL IVPB (premix) (4.5 g Intravenous New Bag 6/12/22 2149)   sodium chloride 0.9 % bolus 1,000 mL (1,000 mL Intravenous New Bag 6/12/22 2139)   HYDROmorphone (DILAUDID) injection 0.5 mg (0.5 mg Intravenous Given 6/12/22 2139)   potassium chloride (MICRO-K) CR capsule 20 mEq (has no administration in time range)   ondansetron (ZOFRAN) injection 4 mg (4 mg Intravenous Given 6/12/22 2139)         PROGRESS, DATA ANALYSIS, CONSULTS, AND MEDICAL DECISION MAKING    All labs have been independently reviewed by me.  All radiology studies have been reviewed by me and the radiologist dictating the report.   EKG's have been independently viewed and interpreted by me.      Differential diagnoses: Cellulitis, osteomyelitis, sepsis      ED Course as of 06/12/22 2205   Sun Jun 12, 2022 2018 Sed Rate(!): 45 [TG]   2058 C-Reactive Protein(!): 11.65 [TG]   2058 Procalcitonin(!): 1.02 [TG]   2156 Potassium(!): 2.9 [TG]   2157 WBC(!): 13.42 [TG]      ED Course User Index  [TG] Adalid Robins PA-C     Wounds were cleansed, visible foreign bodies (dog hair and suspected fecal material) removed.  Wound to be covered with nonstick dressing once seen by hospitalist.  Vancomycin and Zosyn initiated here in the emergency department.  We will seek admission to hospitalist service.      AS OF 22:05 EDT VITALS:    BP - 131/69  HR - 106  TEMP - 98.1 °F (36.7 °C) (Oral)  O2 SATS - 96%                  DIAGNOSIS  Final diagnoses:   Sepsis without acute organ dysfunction, due to unspecified organism (HCC)   Gangrene of left foot (HCC)   Open wound of left great toe due to dog bite   Non-insulin dependent type 2 diabetes mellitus (HCC)   Peripheral vascular disease (HCC)   Hypokalemia         DISPOSITION  Admit to hospitalist service           Adalid Robins PA-C  06/12/22 2205      Electronically signed by Mirza Clemons MD at 06/12/22 7015         No current  facility-administered medications for this encounter.     Current Outpatient Medications   Medication Sig Dispense Refill   • amitriptyline (ELAVIL) 25 MG tablet Take 1 tablet by mouth every night.     • aspirin 325 MG tablet Take 1 tablet by mouth daily.     • atorvastatin (LIPITOR) 20 MG tablet TAKE 1 TABLET BY MOUTH EVERY DAY 90 tablet 1   • furosemide (LASIX) 40 MG tablet Take 40 mg by mouth As Needed.     • ipratropium-albuterol (COMBIVENT RESPIMAT)  MCG/ACT inhaler Inhale 1 puff 4 (Four) Times a Day As Needed for Wheezing (pt has not used recently).     • metFORMIN (GLUCOPHAGE) 1000 MG tablet TAKE 1 TABLET TWICE DAILY WITH THE MORNING AND EVENING MEALS 180 tablet 1   • oxyCODONE-acetaminophen (PERCOCET) 5-325 MG per tablet Take 1 tablet by mouth Every 6 (Six) Hours As Needed.     • pregabalin (LYRICA) 225 MG capsule Take  by mouth.     • rivaroxaban (XARELTO) 10 MG tablet Take 2.5 mg by mouth Daily.     • cephalexin (KEFLEX) 500 MG capsule Take 500 mg by mouth 2 (two) times a day.     • clopidogrel (PLAVIX) 75 MG tablet TAKE 1 TABLET BY MOUTH DAILY 90 tablet 3   • cyclobenzaprine (FLEXERIL) 10 MG tablet Take 1 tablet by mouth 3 (three) times a day as needed.     • gabapentin (NEURONTIN) 600 MG tablet Take 1 tablet by mouth 3 (three) times a day.     • HYDROcodone-acetaminophen (NORCO) 7.5-325 MG per tablet Take 1 tablet by mouth 3 (three) times a day.     • lidocaine (LIDODERM) 5 % Apply  topically.     • metoprolol tartrate (LOPRESSOR) 50 MG tablet Take 1 tablet by mouth every 12 (twelve) hours.       Lab Results (last 48 hours)     Procedure Component Value Units Date/Time    Ferritin [[id number]]  (Abnormal) Collected: 06/12/22 1956    Specimen: Blood Updated: 06/13/22 0236     Ferritin 207.40 ng/mL     Narrative:      Results may be falsely decreased if patient taking Biotin.      Lactate Dehydrogenase [[id number]]  (Abnormal) Collected: 06/12/22 1956    Specimen: Blood Updated: 06/13/22 0230     LDH  305 U/L     Respiratory Panel PCR w/COVID-19(SARS-CoV-2) MITALI/PHAM/JOSELUIS/PAD/COR/MAD/ANNA In-House, NP Swab in UTM/VTM, 3-4 HR TAT - Swab, Nasopharynx [[id number]]  (Abnormal) Collected: 06/13/22 0042    Specimen: Swab from Nasopharynx Updated: 06/13/22 0134     ADENOVIRUS, PCR Not Detected     Coronavirus 229E Not Detected     Coronavirus HKU1 Not Detected     Coronavirus NL63 Not Detected     Coronavirus OC43 Not Detected     COVID19 Detected     Human Metapneumovirus Not Detected     Human Rhinovirus/Enterovirus Not Detected     Influenza A PCR Not Detected     Influenza B PCR Not Detected     Parainfluenza Virus 1 Not Detected     Parainfluenza Virus 2 Not Detected     Parainfluenza Virus 3 Not Detected     Parainfluenza Virus 4 Not Detected     RSV, PCR Not Detected     Bordetella pertussis pcr Not Detected     Bordetella parapertussis PCR Not Detected     Chlamydophila pneumoniae PCR Not Detected     Mycoplasma pneumo by PCR Not Detected    Narrative:      In the setting of a positive respiratory panel with a viral infection PLUS a negative procalcitonin without other underlying concern for bacterial infection, consider observing off antibiotics or discontinuation of antibiotics and continue supportive care. If the respiratory panel is positive for atypical bacterial infection (Bordetella pertussis, Chlamydophila pneumoniae, or Mycoplasma pneumoniae), consider antibiotic de-escalation to target atypical bacterial infection.    [place] Draw [[id number]] Collected: 06/12/22 1956    Specimen: Blood Updated: 06/13/22 0002    Narrative:      The following orders were created for panel order [place] Draw.  Procedure                               Abnormality         Status                     ---------                               -----------         ------                     Green Top (Gel)[[id number]]                                  Final result               Lavender Top[[id number]]                                      Final result               Gold Top - [facility][[id number]]                                   Final result               Pedro Top[[id number]]                                         Final result               Light Blue Top[[id number]]                                   Final result                 Please view results for these tests on the individual orders.    Pedro Top [[id number]] Collected: 06/12/22 1956    Specimen: Blood Updated: 06/13/22 0002     Extra Tube Hold for add-ons.     Comment: Auto resulted.       COVID PRE-OP / PRE-PROCEDURE SCREENING ORDER (NO ISOLATION) - Swab, Nasopharynx [[id number]]  (Abnormal) Collected: 06/12/22 2235    Specimen: Swab from Nasopharynx Updated: 06/12/22 2333    Narrative:      The following orders were created for panel order COVID PRE-OP / PRE-PROCEDURE SCREENING ORDER (NO ISOLATION) - Swab, Nasopharynx.  Procedure                               Abnormality         Status                     ---------                               -----------         ------                     COVID-19, FLU A/B, RSV P...[[id number]]  Abnormal            Final result                 Please view results for these tests on the individual orders.    COVID-19, FLU A/B, RSV PCR - Swab, Nasopharynx [[id number]]  (Abnormal) Collected: 06/12/22 2235    Specimen: Swab from Nasopharynx Updated: 06/12/22 2333     COVID19 Detected     Influenza A PCR Not Detected     Influenza B PCR Not Detected     RSV, PCR Not Detected    Narrative:      Fact sheet for providers: https://www.fda.gov/media/603416/download    Fact sheet for patients: https://www.fda.gov/media/911603/download    Test performed by PCR.    Magnesium [[id number]]  (Normal) Collected: 06/12/22 1956    Specimen: Blood Updated: 06/12/22 2114     Magnesium 1.7 mg/dL     Green Top (Gel) [[id number]] Collected: 06/12/22 1956    Specimen: Blood Updated: 06/12/22 2102     Extra Tube Hold for add-ons.     Comment: Auto resulted.       Light Blue Top [[id number]]  "Collected: 06/12/22 1956    Specimen: Blood Updated: 06/12/22 2102     Extra Tube Hold for add-ons.     Comment: Auto resulted       Lavender Top [[id number]] Collected: 06/12/22 1956    Specimen: Blood Updated: 06/12/22 2102     Extra Tube hold for add-on     Comment: Auto resulted       Gold Top - SST [[id number]] Collected: 06/12/22 1956    Specimen: Blood Updated: 06/12/22 2102     Extra Tube Hold for add-ons.     Comment: Auto resulted.       C-reactive Protein [[id number]]  (Abnormal) Collected: 06/12/22 1956    Specimen: Blood Updated: 06/12/22 2044     C-Reactive Protein 11.65 mg/dL     Procalcitonin [[id number]]  (Abnormal) Collected: 06/12/22 1956    Specimen: Blood Updated: 06/12/22 2038     Procalcitonin 1.02 ng/mL     Narrative:      As a Marker for Sepsis (Non-Neonates):    1. <0.5 ng/mL represents a low risk of severe sepsis and/or septic shock.  2. >2 ng/mL represents a high risk of severe sepsis and/or septic shock.    As a Marker for Lower Respiratory Tract Infections that require antibiotic therapy:    PCT on Admission    Antibiotic Therapy       6-12 Hrs later    >0.5                Strongly Recommended  >0.25 - <0.5        Recommended   0.1 - 0.25          Discouraged              Remeasure/reassess PCT  <0.1                Strongly Discouraged     Remeasure/reassess PCT    As 28 day mortality risk marker: \"Change in Procalcitonin Result\" (>80% or <=80%) if Day 0 (or Day 1) and Day 4 values are available. Refer to http://www.[facility]-pct-calculator.com    Change in PCT <=80%  A decrease of PCT levels below or equal to 80% defines a positive change in PCT test result representing a higher risk for 28-day all-cause mortality of patients diagnosed with severe sepsis for septic shock.    Change in PCT >80%  A decrease of PCT levels of more than 80% defines a negative change in PCT result representing a lower risk for 28-day all-cause mortality of patients diagnosed with severe sepsis or septic shock.    "    Sedimentation Rate [[id number]]  (Abnormal) Collected: 06/12/22 1956    Specimen: Blood Updated: 06/12/22 2034     Sed Rate 45 mm/hr     Comprehensive Metabolic Panel [[id number]]  (Abnormal) Collected: 06/12/22 1956    Specimen: Blood Updated: 06/12/22 2032     Glucose 97 mg/dL      BUN 12 mg/dL      Creatinine 0.53 mg/dL      Sodium 139 mmol/L      Potassium 2.9 mmol/L      Comment: Slight hemolysis detected by analyzer. Results may be affected.        Chloride 97 mmol/L      CO2 31.0 mmol/L      Calcium 8.7 mg/dL      Total Protein 6.8 g/dL      Albumin 2.70 g/dL      ALT (SGPT) 8 U/L      AST (SGOT) 23 U/L      Alkaline Phosphatase 191 U/L      Total Bilirubin 3.0 mg/dL      Globulin 4.1 gm/dL      Comment: Calculated Result        A/G Ratio 0.7 g/dL      BUN/Creatinine Ratio 22.6     Anion Gap 11.0 mmol/L      eGFR 114.2 mL/min/1.73      Comment: National Kidney Foundation and American Society of Nephrology (ASN) Task Force recommended calculation based on the Chronic Kidney Disease Epidemiology Collaboration (CKD-EPI) equation refit without adjustment for race.       Narrative:      GFR Normal >60  Chronic Kidney Disease <60  Kidney Failure <15      Lactic Acid, Plasma [[id number]]  (Normal) Collected: 06/12/22 1956    Specimen: Blood Updated: 06/12/22 2030     Lactate 2.0 mmol/L      Comment: Falsely depressed results may occur on samples drawn from patients receiving N-Acetylcysteine (NAC) or Metamizole.       CBC & Differential [[id number]]  (Abnormal) Collected: 06/12/22 1956    Specimen: Blood Updated: 06/12/22 2018    Narrative:      The following orders were created for panel order CBC & Differential.  Procedure                               Abnormality         Status                     ---------                               -----------         ------                     CBC Auto Differential[[id number]]        Abnormal            Final result                 Please view results for these tests on the  individual orders.    CBC Auto Differential [[id number]]  (Abnormal) Collected: 06/12/22 1956    Specimen: Blood Updated: 06/12/22 2018     WBC 13.42 10*3/mm3      RBC 3.88 10*6/mm3      Hemoglobin 11.6 g/dL      Hematocrit 36.4 %      MCV 93.8 fL      MCH 29.9 pg      MCHC 31.9 g/dL      RDW 18.8 %      RDW-SD 65.2 fl      MPV 10.7 fL      Platelets 141 10*3/mm3      Neutrophil % 80.1 %      Lymphocyte % 13.0 %      Monocyte % 5.5 %      Eosinophil % 0.3 %      Basophil % 0.2 %      Immature Grans % 0.9 %      Neutrophils, Absolute 10.75 10*3/mm3      Lymphocytes, Absolute 1.74 10*3/mm3      Monocytes, Absolute 0.74 10*3/mm3      Eosinophils, Absolute 0.04 10*3/mm3      Basophils, Absolute 0.03 10*3/mm3      Immature Grans, Absolute 0.12 10*3/mm3      nRBC 0.0 /100 WBC           Imaging Results (Last 48 Hours)     Procedure Component Value Units Date/Time    XR Chest 1 View [[id number]] Collected: 06/13/22 0205     Updated: 06/13/22 0206    Narrative:      FRONTAL VIEW OF THE CHEST    CLINICAL INDICATION: Hypoxia.    COMPARISON: None.    FINDINGS: No focal consolidation, pleural effusion or pneumothorax. Cardiomediastinal morphology is normal.  Sternotomy wires appear nondisplaced.      Impression:      No acute cardiopulmonary abnormality.    Electronically signed by:  Yonny Ybarra M.D.    6/13/2022 12:05 AM Mountain Time    XR Foot 3+ View Left [[id number]] Collected: 06/12/22 2301     Updated: 06/12/22 2304    Narrative:      3 views left foot    CLINICAL INDICATION: Left foot pain.    COMPARISON: None.    FINDINGS: Abundant soft tissue swelling is present in the forefoot and about the first through third digits. Scattered subcutaneous gas is seen along the second proximal phalanx and the interspace between the first and second proximal phalanges. A   possible acute fracture at seen at the base of the second proximal phalanx. There is osteolysis of the distal aspect of the second proximal phalanx.        Impression:      1. Findings suggestive of soft tissue infection in the forefoot involving the first through third toes. Soft tissue gas may reflect ulceration or subcutaneous abscess.  2. Chronic versus acute on chronic second proximal phalangeal osteomyelitis suspected.    Electronically signed by:  Yonny Ybarra M.D.    6/12/2022 9:03 PM Mountain Time           Consult Notes (last 48 hours)      Eber Cuellar IV, PharmD at 06/12/22 2328          Pharmacy Consult-Vancomycin Dosing  Venus Conn is a  46 y.o. female receiving vancomycin therapy.     Indication: SSTI  Consulting Provider: Hospitalist  ID Consult:     Goal AUC: 400 - 600 mg/L*hr    Current Antimicrobial Therapy  Anti-Infectives (From admission, onward)      Ordered     Dose/Rate Route Frequency Start Stop    06/12/22 2328  vancomycin 1250 mg/250 mL 0.9% NS IVPB ([facility])        Ordering Provider: Eber Cuellar IV PharmD    15 mg/kg × 86.2 kg  over 90 Minutes Intravenous Every 12 Hours 06/13/22 1200 06/20/22 1159    06/12/22 2320  piperacillin-tazobactam (ZOSYN) 3.375 g in iso-osmotic dextrose 50 ml (premix)        Ordering Provider: Saran Umanzor PA-C    3.375 g  over 4 Hours Intravenous Every 8 Hours 06/13/22 0600 06/20/22 0559    06/12/22 2319  Pharmacy to dose vancomycin        Ordering Provider: Saran Umanzor PA-C     Does not apply Continuous PRN 06/12/22 2319 06/19/22 2318    06/12/22 2101  piperacillin-tazobactam (ZOSYN) 4.5 g in iso-osmotic dextrose 100 mL IVPB (premix)        Ordering Provider: Adalid Robins PA-C    4.5 g  over 30 Minutes Intravenous Once 06/12/22 2103 06/12/22 2230    06/12/22 2058  vancomycin 1750 mg/500 mL 0.9% NS IVPB ([facility])        Ordering Provider: Adalid Robins PA-C    20 mg/kg × 82.1 kg  over 2 Hours Intravenous Once 06/12/22 2100              Allergies  Allergies as of 06/12/2022   • (No Known Allergies)       Labs    Results from last 7 days   Lab Units 06/12/22 1956   BUN mg/dL 12  "  CREATININE mg/dL 0.53*       Results from last 7 days   Lab Units 06/12/22 1956   WBC 10*3/mm3 13.42*       Evaluation of Dosing     Last Dose Received in the ED/Outside Facility: 1750 mg infusing  Is Patient on Dialysis or Renal Replacement: N    Ht - 167.6 cm (66\")  Wt - 86.2 kg (190 lb 1.6 oz)    Estimated Creatinine Clearance: 143.7 mL/min (A) (by C-G formula based on SCr of 0.53 mg/dL (L)).    Intake & Output (last 3 days)       None            Microbiology and Radiology  Microbiology Results (last 10 days)       Procedure Component Value - Date/Time    Wound Culture - Wound, Foot, Left [[id number]] Collected: 06/12/22 2142    Lab Status: Preliminary result Specimen: Wound from Foot, Left Updated: 06/12/22 2221     Gram Stain No WBCs seen      Moderate (3+) Gram positive cocci      Many (4+) Gram negative bacilli      Few (2+) Gram variable bacilli    Wound Culture - Wound, Foot, Right [[id number]] Collected: 06/12/22 2142    Lab Status: Preliminary result Specimen: Wound from Foot, Right Updated: 06/12/22 2218     Gram Stain Rare (1+) WBCs seen      Few (2+) Gram positive cocci            Reported Vancomycin Levels                         Insight[employer] AUC Calculation:    Current AUC:    mg/L*hr    Predicted Steady State AUC on Current Dose:  mg/L*hr  _________________________________    Predicted Steady State AUC on New Dose:   464 mg/L*hr    Assessment/Plan:    Moderate age obese female with low SCr reflective of habitus.  1750 mg IV vancomycin infusing.  Start 1250 mg IV vancomycin q12h at noon.    Random level with AM labs Tuesday.    Thank you for this consult.  Eber Cuellar IV, PharmD, BCPS  6/12/2022  23:28 EDT       Electronically signed by Eber Cuellar IV, PharmD at 06/12/22 9223       "

## 2022-06-15 LAB
BACTERIA SPEC AEROBE CULT: ABNORMAL
GRAM STN SPEC: ABNORMAL

## 2022-06-15 NOTE — DISCHARGE SUMMARY
[facility] Medicine Services  DEATH SUMMARY    Patient Name: Venus Conn  : 1975  MRN: [id number]    Date of Admission: 2022  Date of Death:  2022  Time of Death:  0400    Primary Care Physician: Thalia Jose APRN    Consults     No orders found from 2022 to 2022.          Summary of Hospital Events     Presenting Problem:   Sepsis without acute organ dysfunction, due to unspecified organism (HCC) [A41.9]    Active Hospital Problems    Diagnosis  POA   • **Sepsis without acute organ dysfunction, due to unspecified organism (HCC) [A41.9]  Yes   • Tobacco abuse [Z72.0]  Yes   • Diabetes mellitus, type 2 (HCC) [E11.9]  Yes   • Hypokalemia [E87.6]  Yes   • Open wounds of both lower extremities [S81.801A, S81.802A]  Yes   • Peripheral vascular disease (HCC) [I73.9]  Yes   • Hyperlipidemia [E78.5]  Yes   • Hypertension [I10]  Yes   • Chronic obstructive pulmonary disease (HCC) [J44.9]  Yes   • Coronary artery disease [I25.10]  Yes   • Obesity [E66.9]  Yes   • Chronic pain [G89.29]  Yes      Resolved Hospital Problems   No resolved problems to display.          Hospital Course:  Venus Conn was a 46 y.o. female with a past medical history significant for chronic respiratory failure with COPD and ongoing tobacco abuse on 2L NC as needed, hypertension, HLD, chronic systolic heart failure, CAD s/p CABG, PVD chronically anticoagulated on Xarelto, DM2, and chronic pain. She presented with complaints of bilateral lower extremity wounds which she first noticed approximately one month ago. Wounds have progressively worsened over that time despite  cleaning and wrapping them. Wounds are foul smelling and there is drainage of blood and purulent fluid. Patient adds that she has significant neuropathy related to DM and has almost zero sensation in lower extremities. States 3 weeks ago  dressed her wounds with Medihoney. Later that evening, the patient  fell asleep with her feet exposed and her pets chewed at the dressing and ate through to her left second toe. There is subsequent increased redness and swelling around the toe and left foot with what appears to be gangrenous tissue. Patient has refused medical evaluation of wounds for the last several weeks, until now.  reports her mother passed away a week ago and the patient has been depressed. She is now ready for treatment and understands that may require amputation. Of note, patient has been dyspneic, notes cough and congestion. Has been requiring use of supplemental more frequently. Soon after transfer to the medical floor, she was found to be bradycardic and code blue was called. After approximately 40 minutes of the team's efforts, the code was stopped and the patient was pronounced as .      Official Cause of Death and any directly related diagnoses:  Sepsis, COVID-19.      Demetrius Cuellar,III, DO  22

## 2022-06-16 NOTE — PROGRESS NOTES
"Enter Query Response Below      Query Response:   During my visit, O2 sats on monitor were in mid-90s. Further specification should read   Acute hypoxic respiratory failure, improved.           If applicable, please update the problem list.        Patient: Venus Conn        : 1975  Account: [id number]           Admit Date: 2022        Options to Respond to Query:    1. Access the Encounter     a. From the To-Do Side bar, click Respond With Note.     b. Click New Note     c. Answer query within the yellow box.                d. Update the Problem List if applicable.     Dr. Cuellar,     46 yr female noted with COVID-19 pneumonia with COVID-19 detected on 22. H&P also reflects\" patient insists initial COVID PCR is false positive and refuses to move to COVID unit unless confirmatory tests can corroborate positive result\". Patient  22. Remdesivir,  Decadron , zosyn and vancomycin given. Chest xray reflects no acute cardiopulmonary abnormality.     After study, can the diagnosis of COVID-19 pneumonia be further specified as:    COVID-19 pneumonia on admission confirmed  COVID-19 pneumonia ruled out  Other__  Unable to determine       By submitting this query, we are merely seeking further clarification of documentation to accurately reflect all conditions that you are monitoring, evaluating, treating or that extend the hospitalization or utilize additional resources of care. Please utilize your independent clinical judgment when addressing the question(s) above.     This query and your response, once completed, will be entered into the legal medical record.    Sincerely,  Shawanda CONNELLY Rn Ext. [phone]  Clinical Documentation Integrity Program   "

## 2022-06-17 LAB
BACTERIA SPEC AEROBE CULT: NORMAL
BACTERIA SPEC AEROBE CULT: NORMAL